# Patient Record
Sex: FEMALE | Race: WHITE | Employment: FULL TIME
[De-identification: names, ages, dates, MRNs, and addresses within clinical notes are randomized per-mention and may not be internally consistent; named-entity substitution may affect disease eponyms.]

---

## 2017-01-13 ENCOUNTER — PROCEDURE VISIT (OUTPATIENT)
Dept: OBGYN | Facility: CLINIC | Age: 37
End: 2017-01-13

## 2017-01-13 VITALS
SYSTOLIC BLOOD PRESSURE: 102 MMHG | DIASTOLIC BLOOD PRESSURE: 80 MMHG | WEIGHT: 227.6 LBS | BODY MASS INDEX: 40.33 KG/M2 | HEIGHT: 63 IN

## 2017-01-13 DIAGNOSIS — Z30.430 ENCOUNTER FOR INSERTION OF INTRAUTERINE CONTRACEPTIVE DEVICE: Primary | ICD-10-CM

## 2017-01-13 DIAGNOSIS — Z11.3 ROUTINE SCREENING FOR STI (SEXUALLY TRANSMITTED INFECTION): ICD-10-CM

## 2017-01-13 PROCEDURE — 58300 INSERT INTRAUTERINE DEVICE: CPT | Performed by: NURSE PRACTITIONER

## 2017-02-10 ENCOUNTER — OFFICE VISIT (OUTPATIENT)
Dept: OBGYN | Facility: CLINIC | Age: 37
End: 2017-02-10

## 2017-02-10 VITALS
DIASTOLIC BLOOD PRESSURE: 74 MMHG | BODY MASS INDEX: 40.11 KG/M2 | HEIGHT: 63 IN | SYSTOLIC BLOOD PRESSURE: 104 MMHG | WEIGHT: 226.4 LBS

## 2017-02-10 DIAGNOSIS — Z97.5 IUD (INTRAUTERINE DEVICE) IN PLACE: Primary | ICD-10-CM

## 2017-07-12 ENCOUNTER — OFFICE VISIT (OUTPATIENT)
Dept: FAMILY MEDICINE CLINIC | Age: 37
End: 2017-07-12
Payer: MEDICAID

## 2017-07-12 VITALS
WEIGHT: 221.8 LBS | HEIGHT: 63 IN | SYSTOLIC BLOOD PRESSURE: 97 MMHG | RESPIRATION RATE: 12 BRPM | DIASTOLIC BLOOD PRESSURE: 67 MMHG | BODY MASS INDEX: 39.3 KG/M2 | HEART RATE: 95 BPM | OXYGEN SATURATION: 97 %

## 2017-07-12 DIAGNOSIS — E28.2 PCOS (POLYCYSTIC OVARIAN SYNDROME): ICD-10-CM

## 2017-07-12 DIAGNOSIS — K21.9 GASTROESOPHAGEAL REFLUX DISEASE, ESOPHAGITIS PRESENCE NOT SPECIFIED: Primary | ICD-10-CM

## 2017-07-12 PROCEDURE — 99214 OFFICE O/P EST MOD 30 MIN: CPT | Performed by: FAMILY MEDICINE

## 2017-07-12 RX ORDER — OMEPRAZOLE 40 MG/1
40 CAPSULE, DELAYED RELEASE ORAL DAILY
Qty: 30 CAPSULE | Refills: 3 | Status: SHIPPED | OUTPATIENT
Start: 2017-07-12 | End: 2017-12-12 | Stop reason: SDUPTHER

## 2017-07-12 ASSESSMENT — PATIENT HEALTH QUESTIONNAIRE - PHQ9
2. FEELING DOWN, DEPRESSED OR HOPELESS: 1
SUM OF ALL RESPONSES TO PHQ QUESTIONS 1-9: 1
1. LITTLE INTEREST OR PLEASURE IN DOING THINGS: 0
SUM OF ALL RESPONSES TO PHQ9 QUESTIONS 1 & 2: 1

## 2017-10-03 ENCOUNTER — TELEPHONE (OUTPATIENT)
Dept: GASTROENTEROLOGY | Age: 37
End: 2017-10-03

## 2017-10-03 ENCOUNTER — OFFICE VISIT (OUTPATIENT)
Dept: GASTROENTEROLOGY | Age: 37
End: 2017-10-03
Payer: MEDICAID

## 2017-10-03 VITALS
WEIGHT: 225 LBS | HEART RATE: 108 BPM | OXYGEN SATURATION: 98 % | RESPIRATION RATE: 14 BRPM | HEIGHT: 63 IN | TEMPERATURE: 97.3 F | BODY MASS INDEX: 39.87 KG/M2 | SYSTOLIC BLOOD PRESSURE: 126 MMHG | DIASTOLIC BLOOD PRESSURE: 84 MMHG

## 2017-10-03 DIAGNOSIS — K80.20 CALCULUS OF GALLBLADDER WITHOUT CHOLECYSTITIS WITHOUT OBSTRUCTION: ICD-10-CM

## 2017-10-03 DIAGNOSIS — K21.9 GASTROESOPHAGEAL REFLUX DISEASE WITHOUT ESOPHAGITIS: Primary | ICD-10-CM

## 2017-10-03 PROCEDURE — 99244 OFF/OP CNSLTJ NEW/EST MOD 40: CPT | Performed by: INTERNAL MEDICINE

## 2017-10-03 ASSESSMENT — ENCOUNTER SYMPTOMS
NAUSEA: 1
EYES NEGATIVE: 1
BLOOD IN STOOL: 0
ANAL BLEEDING: 0
DIARRHEA: 0
CONSTIPATION: 0
RESPIRATORY NEGATIVE: 1
ABDOMINAL DISTENTION: 0
VOMITING: 0
ABDOMINAL PAIN: 0
RECTAL PAIN: 0

## 2017-10-03 NOTE — MR AVS SNAPSHOT
estimate of body fat, calculated from your height and weight. The higher your BMI, the greater your risk of heart disease, high blood pressure, type 2 diabetes, stroke, gallstones, arthritis, sleep apnea, and certain cancers. BMI is not perfect. It may overestimate body fat in athletes and people who are more muscular. Even a small weight loss (between 5 and 10 percent of your current weight) by decreasing your calorie intake and becoming more physically active will help lower your risk of developing or worsening diseases associated with obesity. Learn more at: [employer]             Medications and Orders      Your Current Medications Are              metFORMIN (GLUCOPHAGE) 500 MG tablet Take 1 tablet by mouth 2 times daily (with meals)    omeprazole (PRILOSEC) 40 MG delayed release capsule Take 1 capsule by mouth daily    Multiple Vitamin (MULTI VITAMIN DAILY PO) Take by mouth      Allergies              Bee Venom          Additional Information        Basic Information     Date Of Birth Sex Race Ethnicity Preferred Language    1980 Female White Non-/Non  English      Problem List as of 10/3/2017  Date Reviewed: 7/12/2017                Gastroesophageal reflux disease without esophagitis    PCOS (polycystic ovarian syndrome)    Menorrhagia with irregular cycle    Routine screening for STI (sexually transmitted infection)    Abnormal uterine bleeding (AUB)      Preventive Care        Date Due    HIV screening is recommended for all people regardless of risk factors  aged 15-65 years at least once (lifetime) who have never been HIV tested. 9/13/1997    Tetanus Combination Vaccine (1 - Tdap) 9/13/2001    Pap Smear 12/7/2016    Yearly Flu Vaccine (1) 9/1/2017            MyChart Signup           Our records indicate that you have an active [employer] account.     You can view your After Visit Summary by going to

## 2017-10-03 NOTE — PROGRESS NOTES
Subjective:      Patient ID: Dion Sifuentes is a 30 y.o. female. HPI  Dr. Kirby Butt MD has requested that I see Dion Sifuentes for a consult for   1. Gastroesophageal reflux disease without esophagitis    2. Calculus of gallbladder without cholecystitis without obstruction     . Patient is here for the firs time. Gastroesophageal Reflux (this has been ongoing for about 17 years, states she has burning in chest and throat, has been on Prilosec for years and seems to do a pretty good job of controlling symptom, she states if she misses a dose she has severe symptoms )    Patient is here for the 1st time  Has long-standing reflux for years more than 17 years  Been taking generic omeprazole controlling her symptoms  But if she doesn't take it the symptoms come back right away  No dysphagia or odynophagia  No nausea or vomiting  No weight loss  No bleeding  No lower GI bleed or symptoms. She does have a history of gallstones but they're not bothering her at all she has no abdominal pain she was recommended surgery in the past but because she is asymptomatic she did not do it yet        Past Medical, Family, and Social History reviewed and does contribute to the patient presenting condition. patient\"s PMH/PSH,SH,PSYCH hx, MEDs, ALLERGIES, and ROS was all reviewed and updated ion the appropriate sections    Review of Systems   Constitutional: Positive for fatigue. HENT: Positive for postnasal drip. Eyes: Negative. Respiratory: Negative. Cardiovascular: Negative. Gastrointestinal: Positive for nausea. Negative for abdominal distention, abdominal pain, anal bleeding, blood in stool, constipation, diarrhea, rectal pain and vomiting. Endocrine: Negative. Genitourinary: Negative. Musculoskeletal: Negative. Skin: Negative. Allergic/Immunologic: Positive for environmental allergies. Neurological: Positive for dizziness and headaches. Hematological: Bruises/bleeds easily.

## 2017-10-23 ENCOUNTER — TELEPHONE (OUTPATIENT)
Dept: GASTROENTEROLOGY | Age: 37
End: 2017-10-23

## 2017-10-23 NOTE — TELEPHONE ENCOUNTER
Writer tried calling pt to get her scheduked for her egd. No answer so writer LVM for pt to call office to schedule.

## 2017-12-04 ENCOUNTER — APPOINTMENT (OUTPATIENT)
Dept: GENERAL RADIOLOGY | Age: 37
End: 2017-12-04
Payer: MEDICAID

## 2017-12-04 ENCOUNTER — HOSPITAL ENCOUNTER (EMERGENCY)
Age: 37
Discharge: HOME | End: 2017-12-04
Attending: EMERGENCY MEDICINE
Payer: MEDICAID

## 2017-12-04 VITALS
BODY MASS INDEX: 38.97 KG/M2 | DIASTOLIC BLOOD PRESSURE: 80 MMHG | OXYGEN SATURATION: 98 % | WEIGHT: 220 LBS | HEART RATE: 98 BPM | SYSTOLIC BLOOD PRESSURE: 127 MMHG | RESPIRATION RATE: 16 BRPM | TEMPERATURE: 97.5 F

## 2017-12-04 DIAGNOSIS — S51.012A ELBOW LACERATION, LEFT, INITIAL ENCOUNTER: Primary | ICD-10-CM

## 2017-12-04 PROCEDURE — 99282 EMERGENCY DEPT VISIT SF MDM: CPT

## 2017-12-04 PROCEDURE — 12001 RPR S/N/AX/GEN/TRNK 2.5CM/<: CPT

## 2017-12-04 PROCEDURE — 96372 THER/PROPH/DIAG INJ SC/IM: CPT

## 2017-12-04 PROCEDURE — 6360000002 HC RX W HCPCS

## 2017-12-04 PROCEDURE — 73080 X-RAY EXAM OF ELBOW: CPT

## 2017-12-04 PROCEDURE — 2500000003 HC RX 250 WO HCPCS: Performed by: EMERGENCY MEDICINE

## 2017-12-04 RX ORDER — LIDOCAINE HYDROCHLORIDE AND EPINEPHRINE 10; 10 MG/ML; UG/ML
20 INJECTION, SOLUTION INFILTRATION; PERINEURAL ONCE
Status: COMPLETED | OUTPATIENT
Start: 2017-12-04 | End: 2017-12-04

## 2017-12-04 RX ORDER — ONDANSETRON 2 MG/ML
4 INJECTION INTRAMUSCULAR; INTRAVENOUS ONCE
Status: COMPLETED | OUTPATIENT
Start: 2017-12-04 | End: 2017-12-04

## 2017-12-04 RX ORDER — SULFAMETHOXAZOLE AND TRIMETHOPRIM 800; 160 MG/1; MG/1
1 TABLET ORAL 2 TIMES DAILY
COMMUNITY
End: 2018-01-25 | Stop reason: ALTCHOICE

## 2017-12-04 RX ORDER — ONDANSETRON 2 MG/ML
INJECTION INTRAMUSCULAR; INTRAVENOUS
Status: COMPLETED
Start: 2017-12-04 | End: 2017-12-04

## 2017-12-04 RX ADMIN — ONDANSETRON 4 MG: 2 INJECTION INTRAMUSCULAR; INTRAVENOUS at 02:10

## 2017-12-04 RX ADMIN — LIDOCAINE HYDROCHLORIDE,EPINEPHRINE BITARTRATE 20 ML: 10; .01 INJECTION, SOLUTION INFILTRATION; PERINEURAL at 01:30

## 2017-12-04 ASSESSMENT — ENCOUNTER SYMPTOMS
DIARRHEA: 0
EYE PAIN: 0
VOMITING: 0
EYE DISCHARGE: 0
ABDOMINAL PAIN: 0
NAUSEA: 0
COUGH: 0
SHORTNESS OF BREATH: 0

## 2017-12-04 ASSESSMENT — PAIN DESCRIPTION - ORIENTATION: ORIENTATION: LEFT

## 2017-12-04 ASSESSMENT — PAIN DESCRIPTION - PAIN TYPE: TYPE: ACUTE PAIN

## 2017-12-04 ASSESSMENT — PAIN DESCRIPTION - DESCRIPTORS: DESCRIPTORS: DISCOMFORT

## 2017-12-04 ASSESSMENT — PAIN SCALES - GENERAL
PAINLEVEL_OUTOF10: 8
PAINLEVEL_OUTOF10: 8

## 2017-12-04 ASSESSMENT — PAIN DESCRIPTION - LOCATION: LOCATION: ELBOW

## 2017-12-04 NOTE — ED PROVIDER NOTES
trauma to scalp or face   Eyes: Conjunctivae are normal. Pupils are equal, round, and reactive to light. Neck: Normal range of motion. Neck supple. No cervical spine tenderness   Cardiovascular: Normal rate and regular rhythm. Exam reveals no gallop and no friction rub. No murmur heard. Pulmonary/Chest: Effort normal and breath sounds normal. No respiratory distress. She has no wheezes. She has no rales. Abdominal: Soft. Bowel sounds are normal. There is no tenderness. There is no rebound and no guarding. Abdomen soft nontender   Musculoskeletal: She exhibits no edema or deformity. No deformity to the left upper extremity, mild left elbow bony tenderness, no crepitus   Neurological: She is alert and oriented to person, place, and time. She exhibits normal muscle tone. 5/5 strength and sensation intact to left upper extremity   Skin: Skin is warm and dry. No rash noted. Once her laceration to the dorsal aspect of the left elbow, no bleeding   Psychiatric: She has a normal mood and affect. Thought content normal.   Nursing note and vitals reviewed. DIFFERENTIAL  DIAGNOSIS     PLAN (LABS / IMAGING / EKG):  Orders Placed This Encounter   Procedures    XR ELBOW LEFT (MIN 3 VIEWS)       MEDICATIONS ORDERED:  Orders Placed This Encounter   Medications    lidocaine-EPINEPHrine 1 percent-1:[id number] injection 20 mL    ondansetron (ZOFRAN) 4 MG/2ML injection     Monalisa Spencer: shiinet override       DDX: Laceration, fracture, foreign body, laceration communicating with joint    DIAGNOSTIC RESULTS / [facility]     LABS:  No results found for this visit on 12/04/17. IMPRESSION: 29-year-old female with 1 cm laceration over the dorsal aspect of left elbow, bleeding controlled, no signs of neurovascular injury to left upper extremity, tenderness does not need to be updated, we'll x-ray to rule out fracture for body, place 1 stitch after irrigation.     RADIOLOGY:  XR ELBOW LEFT (MIN 3 VIEWS)   Preliminary Result   No acute findings               EKG  None    All EKG's are interpreted by the Emergency Department Physician who either signs or Co-signs this chart in the absence of a cardiologist.    EMERGENCY DEPARTMENT COURSE:  Unremarkable x-ray, laceration irrigated with normal saline, no foreign bodies seen, repaired with 3-0 Ethicon, 2 sutures, patient tolerated the became nauseated and received 4 mg of IM Zofran, no complications, discussed follow-up plan in one week with primary care provider or return to the ED for suture removal, discussed return precautions, suture management and follow-up plan with patient, she understands and agrees with plan    PROCEDURES:  Laceration Repair Procedure Note    Indication: Laceration    Procedure: The patient was placed in the appropriate position and anesthesia around the laceration was obtained by infiltration using 0.5% Bupivacaine without epinephrine. The area was then cleansed with betadine and draped in a sterile fashion. The laceration was closed with 3-0 Ethilon using interrupted sutures. There were no additional lacerations requiring repair. The wound area was then dressed with bacitracin. Total repaired wound length: 1.5 cm. Other Items: Suture count: 2    The patient tolerated the procedure well.     Complications: None        CONSULTS:  None    CRITICAL CARE:  None    FINAL IMPRESSION      1. Elbow laceration, left, initial encounter          DISPOSITION / PLAN     DISPOSITION Decision to Discharge    PATIENT REFERRED TO:  Ben Olmos MD  [address] [place] [address] Rd  [phone]    In 1 week  For suture removal      DISCHARGE MEDICATIONS:  New Prescriptions    No medications on file       Maicol Rogers DO  Emergency Medicine Resident    (Please note that portions of this note were completed with a voice recognition program.  Efforts were made to edit the dictations but occasionally words are

## 2017-12-04 NOTE — ED NOTES
Dr. Niecy Townsend at the bedside to place to sutures in to pt's left elbow, pt tolerated well     Kenisha Wynn RN  12/04/17 5817

## 2017-12-04 NOTE — ED PROVIDER NOTES
[facility]  ED     Emergency Department     Faculty Attestation    I performed a history and physical examination of the patient and discussed management with the resident. I reviewed the residents note and agree with the documented findings and plan of care. Any areas of disagreement are noted on the chart. I was personally present for the key portions of any procedures. I have documented in the chart those procedures where I was not present during the key portions. I have reviewed the emergency nurses triage note. I agree with the chief complaint, past medical history, past surgical history, allergies, medications, social and family history as documented unless otherwise noted below. For Physician Assistant/ Nurse Practitioner cases/documentation I have personally evaluated this patient and have completed at least one if not all key elements of the E/M (history, physical exam, and MDM). Additional findings are as noted. Patient presents with a laceration to her posterior left elbow after she slipped and fell and landed on the elbow tonight. Patient has no other complaints. On exam, patient is resting currently in the bed. There is a half centimeter laceration to the posterior left elbow. Patient does have full range of motion of the elbow but does complain of pain. We'll get an x-ray to rule out foreign body and plan to repair using sutures. Patient's tetanus is up-to-date.       Mickey Lockhart MD  Attending Emergency  Physician              Raydell Hashimoto, MD  12/04/17 2675

## 2017-12-12 ENCOUNTER — OFFICE VISIT (OUTPATIENT)
Dept: FAMILY MEDICINE CLINIC | Age: 37
End: 2017-12-12
Payer: MEDICAID

## 2017-12-12 VITALS
HEART RATE: 79 BPM | BODY MASS INDEX: 40.68 KG/M2 | HEIGHT: 63 IN | RESPIRATION RATE: 16 BRPM | DIASTOLIC BLOOD PRESSURE: 66 MMHG | SYSTOLIC BLOOD PRESSURE: 105 MMHG | OXYGEN SATURATION: 97 % | WEIGHT: 229.6 LBS

## 2017-12-12 DIAGNOSIS — K21.9 GASTROESOPHAGEAL REFLUX DISEASE, ESOPHAGITIS PRESENCE NOT SPECIFIED: Primary | ICD-10-CM

## 2017-12-12 DIAGNOSIS — S51.012D LACERATION OF LEFT ELBOW, SUBSEQUENT ENCOUNTER: ICD-10-CM

## 2017-12-12 PROCEDURE — G8427 DOCREV CUR MEDS BY ELIG CLIN: HCPCS | Performed by: FAMILY MEDICINE

## 2017-12-12 PROCEDURE — 1036F TOBACCO NON-USER: CPT | Performed by: FAMILY MEDICINE

## 2017-12-12 PROCEDURE — 99213 OFFICE O/P EST LOW 20 MIN: CPT | Performed by: FAMILY MEDICINE

## 2017-12-12 PROCEDURE — G8484 FLU IMMUNIZE NO ADMIN: HCPCS | Performed by: FAMILY MEDICINE

## 2017-12-12 PROCEDURE — G8417 CALC BMI ABV UP PARAM F/U: HCPCS | Performed by: FAMILY MEDICINE

## 2017-12-12 PROCEDURE — 99024 POSTOP FOLLOW-UP VISIT: CPT | Performed by: FAMILY MEDICINE

## 2017-12-12 RX ORDER — OMEPRAZOLE 40 MG/1
40 CAPSULE, DELAYED RELEASE ORAL DAILY
Qty: 30 CAPSULE | Refills: 3 | Status: SHIPPED | OUTPATIENT
Start: 2017-12-12 | End: 2018-06-22 | Stop reason: SDUPTHER

## 2017-12-13 NOTE — PROGRESS NOTES
General FM note    Kay Rodney is a 30 y.o. female who presents today for follow up on her  medical conditions as noted below. Kay Rodney is c/o of   Chief Complaint   Patient presents with    Suture / Staple Removal       Patient Active Problem List:     Abnormal uterine bleeding (AUB)     Routine screening for STI (sexually transmitted infection)     Menorrhagia with irregular cycle     PCOS (polycystic ovarian syndrome)     Gastroesophageal reflux disease without esophagitis     Past Medical History:   Diagnosis Date    Anxiety     Dizziness     Gall bladder pain     Headache(784.0)     HSV-1 (herpes simplex virus 1) infection     Hyperthyroidism       Past Surgical History:   Procedure Laterality Date    ENDOMETRIAL BIOPSY  08/09/2016    negative atypia or malignancy    INTRAUTERINE DEVICE INSERTION  01/13/2017    Mirena     Family History   Problem Relation Age of Onset    Lupus Father     Stroke Father     Alcohol Abuse Father     Alcohol Abuse Mother     Thyroid Disease Mother      Current Outpatient Prescriptions   Medication Sig Dispense Refill    omeprazole (PRILOSEC) 40 MG delayed release capsule Take 1 capsule by mouth daily 30 capsule 3    sulfamethoxazole-trimethoprim (BACTRIM DS;SEPTRA DS) 800-160 MG per tablet Take 1 tablet by mouth 2 times daily      metFORMIN (GLUCOPHAGE) 500 MG tablet Take 1 tablet by mouth 2 times daily (with meals) 60 tablet 3    Multiple Vitamin (MULTI VITAMIN DAILY PO) Take by mouth       No current facility-administered medications for this visit. ALLERGIES:    Allergies   Allergen Reactions    Bee Venom        Social History   Substance Use Topics    Smoking status: Former Smoker    Smokeless tobacco: Never Used    Alcohol use 0.0 oz/week      Comment: socially      Body mass index is 40.68 kg/m².   /66   Pulse 79   Resp 16   Ht 5' 2.99\" (1.6 m)   Wt 229 lb 9.6 oz (104.1 kg)   SpO2 97%   BMI 40.68 kg/m²     Subjective:

## 2018-01-25 ENCOUNTER — OFFICE VISIT (OUTPATIENT)
Dept: FAMILY MEDICINE CLINIC | Age: 38
End: 2018-01-25
Payer: MEDICAID

## 2018-01-25 VITALS
RESPIRATION RATE: 16 BRPM | WEIGHT: 237 LBS | HEART RATE: 91 BPM | BODY MASS INDEX: 41.99 KG/M2 | DIASTOLIC BLOOD PRESSURE: 76 MMHG | SYSTOLIC BLOOD PRESSURE: 110 MMHG | HEIGHT: 63 IN

## 2018-01-25 DIAGNOSIS — G44.209 MUSCLE TENSION HEADACHE: Primary | ICD-10-CM

## 2018-01-25 DIAGNOSIS — S13.9XXA CERVICAL SPRAIN, INITIAL ENCOUNTER: ICD-10-CM

## 2018-01-25 PROCEDURE — G8417 CALC BMI ABV UP PARAM F/U: HCPCS | Performed by: FAMILY MEDICINE

## 2018-01-25 PROCEDURE — 99214 OFFICE O/P EST MOD 30 MIN: CPT | Performed by: FAMILY MEDICINE

## 2018-01-25 PROCEDURE — G8427 DOCREV CUR MEDS BY ELIG CLIN: HCPCS | Performed by: FAMILY MEDICINE

## 2018-01-25 PROCEDURE — G8484 FLU IMMUNIZE NO ADMIN: HCPCS | Performed by: FAMILY MEDICINE

## 2018-01-25 PROCEDURE — 1036F TOBACCO NON-USER: CPT | Performed by: FAMILY MEDICINE

## 2018-01-25 RX ORDER — IBUPROFEN 800 MG/1
800 TABLET ORAL EVERY 6 HOURS PRN
COMMUNITY
End: 2018-01-25 | Stop reason: SDUPTHER

## 2018-01-25 RX ORDER — IBUPROFEN 800 MG/1
800 TABLET ORAL EVERY 6 HOURS PRN
Qty: 120 TABLET | Refills: 1 | Status: SHIPPED | OUTPATIENT
Start: 2018-01-25 | End: 2020-07-07 | Stop reason: SDUPTHER

## 2018-01-25 ASSESSMENT — PATIENT HEALTH QUESTIONNAIRE - PHQ9
SUM OF ALL RESPONSES TO PHQ9 QUESTIONS 1 & 2: 0
2. FEELING DOWN, DEPRESSED OR HOPELESS: 0
1. LITTLE INTEREST OR PLEASURE IN DOING THINGS: 0
SUM OF ALL RESPONSES TO PHQ QUESTIONS 1-9: 0
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
## 2018-02-05 ENCOUNTER — OFFICE VISIT (OUTPATIENT)
Dept: FAMILY MEDICINE CLINIC | Age: 38
End: 2018-02-05
Payer: MEDICAID

## 2018-02-05 VITALS
WEIGHT: 238 LBS | BODY MASS INDEX: 42.17 KG/M2 | HEIGHT: 63 IN | OXYGEN SATURATION: 100 % | RESPIRATION RATE: 16 BRPM | DIASTOLIC BLOOD PRESSURE: 68 MMHG | HEART RATE: 81 BPM | SYSTOLIC BLOOD PRESSURE: 96 MMHG

## 2018-02-05 DIAGNOSIS — E28.2 PCOS (POLYCYSTIC OVARIAN SYNDROME): ICD-10-CM

## 2018-02-05 DIAGNOSIS — Z00.01 ENCOUNTER FOR WELL ADULT EXAM WITH ABNORMAL FINDINGS: Primary | ICD-10-CM

## 2018-02-05 PROCEDURE — G8417 CALC BMI ABV UP PARAM F/U: HCPCS | Performed by: FAMILY MEDICINE

## 2018-02-05 PROCEDURE — 99395 PREV VISIT EST AGE 18-39: CPT | Performed by: FAMILY MEDICINE

## 2018-02-05 PROCEDURE — G8484 FLU IMMUNIZE NO ADMIN: HCPCS | Performed by: FAMILY MEDICINE

## 2018-02-05 PROCEDURE — 1036F TOBACCO NON-USER: CPT | Performed by: FAMILY MEDICINE

## 2018-02-05 PROCEDURE — G8427 DOCREV CUR MEDS BY ELIG CLIN: HCPCS | Performed by: FAMILY MEDICINE

## 2018-02-05 NOTE — PATIENT INSTRUCTIONS
Patient Education          phentermine  Pronunciation:  GERRY Chan Bacca  Brand: Adipex-P, Sheree-Cap, Suprenza, T-Diet, Zantryl  What is the most important information I should know about phentermine? Do not use phentermine if you are pregnant or breast-feeding a baby. You should not take phentermine if you have a history of heart disease (coronary artery disease, heart rhythm problems, congestive heart failure, stroke), severe or uncontrolled high blood pressure, overactive thyroid, glaucoma, a history of pulmonary hypertension, if you are in an agitated state, or if you have a history of drug or alcohol abuse. Do not use phentermine if you have taken an MAO inhibitor in the past 14 days. A dangerous drug interaction could occur. MAO inhibitors include isocarboxazid, linezolid, phenelzine, rasagiline, selegiline, and tranylcypromine. What is phentermine? Phentermine is a stimulant similar to an amphetamine. Phentermine is an appetite suppressant that affects the central nervous system. Phentermine is used together with diet and exercise to treat obesity (overweight) in people with risk factors such as high blood pressure, high cholesterol, or diabetes. Phentermine may also be used for purposes not listed in this medication guide. What should I discuss with my healthcare provider before taking phentermine? Do not use phentermine if you have taken an MAO inhibitor in the past 14 days. A dangerous drug interaction could occur. MAO inhibitors include isocarboxazid, linezolid, phenelzine, rasagiline, selegiline, and tranylcypromine.   You should not take phentermine if you are allergic to it, or if you have:  · a history of heart disease (coronary artery disease, heart rhythm problems, congestive heart failure, stroke);  · a history of pulmonary hypertension;  · severe or uncontrolled high blood pressure;  · overactive thyroid;  · glaucoma;  · if you are pregnant or breast-feeding;  · if you are in an agitated state;  · if you have a history of drug or alcohol abuse; or  · if you are allergic to other diet pills, amphetamines, stimulants, or cold medications. Taking phentermine together with other diet medications such as fenfluramine (Phen-Fen) or dexfenfluramine (Redux) can cause a rare fatal lung disorder called pulmonary hypertension. Do not take phentermine with any other diet medications without your doctor's advice. To make sure you phentermine is safe for you, tell your doctor if you have:  · high blood pressure;  · diabetes;  · kidney disease;  · a thyroid disorder; or  · if you are allergic to aspirin or to yellow food dye (FD & C Yellow No. 5, or tartrazine). Phentermine may be habit forming. Never share phentermine with another person, especially someone with a history of drug abuse or addiction. Keep the medication in a place where others cannot get to it. FDA pregnancy category X. Weight loss during pregnancy can harm an unborn baby, even if you are overweight. Do not use phentermine if you are pregnant. Phentermine can pass into breast milk and may harm a nursing baby. You should not breast-feed while taking phentermine. Do not give this medication to a child younger than 12years old. How should I take phentermine? Follow all directions on your prescription label. Do not take this medicine in larger or smaller amounts or for longer than recommended. Some brands of phentermine should be taken on an empty stomach before breakfast or within 2 hours after breakfast.  Suprenza disintegrating tablets can be taken with or without food. Using dry hands, remove the Suprenza tablet from the medicine bottle and place the tablet in your mouth. It will begin to dissolve right away. Do not swallow the tablet whole. Allow it to dissolve in your mouth without chewing.   To prevent sleep problems, take this medication early in the day, no later than 6:00pm.  Talk with your doctor if you have increased hunger or if you otherwise think the medication is not working properly. Taking more of this medication will not make it more effective and can cause serious, life-threatening side effects. Phentermine should be taken only for a short time, such as a few weeks. Do not stop using phentermine suddenly, or you could have unpleasant withdrawal symptoms. Ask your doctor how to safely stop using phentermine. Store at room temperature away from moisture and heat. Keep track of the amount of medicine used from each new bottle. Phentermine is a drug of abuse and you should be aware if anyone is using your medicine improperly or without a prescription. What happens if I miss a dose? Take the missed dose as soon as you remember. Skip the missed dose if it is almost time for your next scheduled dose. Do not  take extra medicine to make up the missed dose. What happens if I overdose? Seek emergency medical attention or call the Poison Help line at 1-984.173.9362. An overdose of phentermine can be fatal.  What should I avoid while taking phentermine? Drinking alcohol can increase certain side effects of phentermine. Phentermine may impair your thinking or reactions. Be careful if you drive or do anything that requires you to be alert. What are the possible side effects of phentermine? Get emergency medical help if you have any of these signs of an allergic reaction: hives; wheezing, chest tightness, trouble breathing; swelling of your face, lips, tongue, or throat.   Call your doctor at once if you have a serious side effect such as:  · feeling short of breath, even with mild exertion;  · chest pain, feeling like you might pass out;  · swelling in your ankles or feet;  · pounding heartbeats or fluttering in your chest;  · confusion or irritability, unusual thoughts or behavior;  · feelings of extreme happiness or sadness; or  · dangerously high blood pressure (severe headache, blurred vision, buzzing in your ears, anxiety, chest pain, shortness of breath, uneven heartbeats, seizure). Common side effects may include:  · feeling restless or hyperactive;  · headache, dizziness, tremors;  · sleep problems (insomnia);  · dry mouth or an unpleasant taste in your mouth;  · diarrhea or constipation, upset stomach; or  · increased or decreased interest in sex, impotence. This is not a complete list of side effects and others may occur. Call your doctor for medical advice about side effects. You may report side effects to FDA at 8-063-FDA-7260. What other drugs will affect phentermine? Taking this medicine with other stimulant drugs that make you restless or hyperactive can worsen these effects. Ask your doctor before taking phentermine with diet pills, other stimulants, or medicine to treat attention deficit hyperactivity disorder (ADHD). Tell your doctor about all medicines you use, and those you start or stop using during your treatment with phentermine, especially:  · an antidepressant --citalopram, escitalopram, fluoxetine, fluvoxamine, paroxetine, sertraline. This list is not complete. Other drugs may interact with phentermine, including prescription and over-the-counter medicines, vitamins, and herbal products. Not all possible interactions are listed in this medication guide. Where can I get more information? Your pharmacist can provide more information about phentermine. Remember, keep this and all other medicines out of the reach of children, never share your medicines with others, and use this medication only for the indication prescribed. Every effort has been made to ensure that the information provided by [address] is accurate, up-to-date, and complete, but no guarantee is made to that effect. Drug information contained herein may be time sensitive.  [facility] information has been compiled for use by healthcare practitioners and consumers in the [place] and therefore [facility] does not warrant that uses

## 2018-02-07 ENCOUNTER — HOSPITAL ENCOUNTER (OUTPATIENT)
Age: 38
Discharge: HOME | End: 2018-02-07
Payer: MEDICAID

## 2018-02-07 DIAGNOSIS — Z00.01 ENCOUNTER FOR WELL ADULT EXAM WITH ABNORMAL FINDINGS: ICD-10-CM

## 2018-02-07 LAB
-: ABNORMAL
ABSOLUTE EOS #: 0.15 K/UL (ref 0–0.44)
ABSOLUTE IMMATURE GRANULOCYTE: <0.03 K/UL (ref 0–0.3)
ABSOLUTE LYMPH #: 2.04 K/UL (ref 1.1–3.7)
ABSOLUTE MONO #: 0.53 K/UL (ref 0.1–1.2)
ALBUMIN SERPL-MCNC: 4.3 G/DL (ref 3.5–5.2)
ALBUMIN/GLOBULIN RATIO: 1.4 (ref 1–2.5)
ALP BLD-CCNC: 41 U/L (ref 35–104)
ALT SERPL-CCNC: 29 U/L (ref 5–33)
AMORPHOUS: ABNORMAL
ANION GAP SERPL CALCULATED.3IONS-SCNC: 12 MMOL/L (ref 9–17)
AST SERPL-CCNC: 23 U/L
BACTERIA: ABNORMAL
BASOPHILS # BLD: 0 % (ref 0–2)
BASOPHILS ABSOLUTE: <0.03 K/UL (ref 0–0.2)
BILIRUB SERPL-MCNC: 0.25 MG/DL (ref 0.3–1.2)
BILIRUBIN URINE: NEGATIVE
BUN BLDV-MCNC: 11 MG/DL (ref 6–20)
BUN/CREAT BLD: ABNORMAL (ref 9–20)
CALCIUM SERPL-MCNC: 8.9 MG/DL (ref 8.6–10.4)
CASTS UA: ABNORMAL /LPF (ref 0–2)
CHLORIDE BLD-SCNC: 100 MMOL/L (ref 98–107)
CHOLESTEROL/HDL RATIO: 4.1
CHOLESTEROL: 198 MG/DL
CO2: 24 MMOL/L (ref 20–31)
COLOR: YELLOW
COMMENT UA: ABNORMAL
CREAT SERPL-MCNC: 0.91 MG/DL (ref 0.5–0.9)
CRYSTALS, UA: ABNORMAL /HPF
DIFFERENTIAL TYPE: NORMAL
EOSINOPHILS RELATIVE PERCENT: 3 % (ref 1–4)
EPITHELIAL CELLS UA: ABNORMAL /HPF (ref 0–5)
ESTIMATED AVERAGE GLUCOSE: 105 MG/DL
GFR AFRICAN AMERICAN: >60 ML/MIN
GFR NON-AFRICAN AMERICAN: >60 ML/MIN
GFR SERPL CREATININE-BSD FRML MDRD: ABNORMAL ML/MIN/{1.73_M2}
GFR SERPL CREATININE-BSD FRML MDRD: ABNORMAL ML/MIN/{1.73_M2}
GLUCOSE BLD-MCNC: 93 MG/DL (ref 70–99)
GLUCOSE URINE: NEGATIVE
HBA1C MFR BLD: 5.3 % (ref 4–6)
HCT VFR BLD CALC: 43.1 % (ref 36.3–47.1)
HDLC SERPL-MCNC: 48 MG/DL
HEMOGLOBIN: 14.3 G/DL (ref 11.9–15.1)
IMMATURE GRANULOCYTES: 0 %
KETONES, URINE: NEGATIVE
LDL CHOLESTEROL: 130 MG/DL (ref 0–130)
LEUKOCYTE ESTERASE, URINE: ABNORMAL
LYMPHOCYTES # BLD: 42 % (ref 24–43)
MCH RBC QN AUTO: 29.9 PG (ref 25.2–33.5)
MCHC RBC AUTO-ENTMCNC: 33.2 G/DL (ref 28.4–34.8)
MCV RBC AUTO: 90 FL (ref 82.6–102.9)
MONOCYTES # BLD: 11 % (ref 3–12)
MUCUS: ABNORMAL
NITRITE, URINE: NEGATIVE
NRBC AUTOMATED: 0 PER 100 WBC
OTHER OBSERVATIONS UA: ABNORMAL
PDW BLD-RTO: 12.4 % (ref 11.8–14.4)
PH UA: 6.5 (ref 5–8)
PLATELET # BLD: 408 K/UL (ref 138–453)
PLATELET ESTIMATE: NORMAL
PMV BLD AUTO: 9.4 FL (ref 8.1–13.5)
POTASSIUM SERPL-SCNC: 4.1 MMOL/L (ref 3.7–5.3)
PROTEIN UA: NEGATIVE
RBC # BLD: 4.79 M/UL (ref 3.95–5.11)
RBC # BLD: NORMAL 10*6/UL
RBC UA: ABNORMAL /HPF (ref 0–2)
RENAL EPITHELIAL, UA: ABNORMAL /HPF
SEG NEUTROPHILS: 44 % (ref 36–65)
SEGMENTED NEUTROPHILS ABSOLUTE COUNT: 2.08 K/UL (ref 1.5–8.1)
SODIUM BLD-SCNC: 136 MMOL/L (ref 135–144)
SPECIFIC GRAVITY UA: 1.02 (ref 1–1.03)
THYROID PEROXIDASE (TPO) AB: 15.4 IU/ML (ref 0–35)
THYROXINE, FREE: 1.06 NG/DL (ref 0.93–1.7)
TOTAL PROTEIN: 7.3 G/DL (ref 6.4–8.3)
TRICHOMONAS: ABNORMAL
TRIGL SERPL-MCNC: 98 MG/DL
TSH SERPL DL<=0.05 MIU/L-ACNC: 2.9 MIU/L (ref 0.3–5)
TURBIDITY: CLEAR
URINE HGB: ABNORMAL
UROBILINOGEN, URINE: NORMAL
VLDLC SERPL CALC-MCNC: NORMAL MG/DL (ref 1–30)
WBC # BLD: 4.8 K/UL (ref 3.5–11.3)
WBC # BLD: NORMAL 10*3/UL
WBC UA: ABNORMAL /HPF (ref 0–5)
YEAST: ABNORMAL

## 2018-02-07 PROCEDURE — 84439 ASSAY OF FREE THYROXINE: CPT

## 2018-02-07 PROCEDURE — 36415 COLL VENOUS BLD VENIPUNCTURE: CPT

## 2018-02-07 PROCEDURE — 80053 COMPREHEN METABOLIC PANEL: CPT

## 2018-02-07 PROCEDURE — 80061 LIPID PANEL: CPT

## 2018-02-07 PROCEDURE — 83036 HEMOGLOBIN GLYCOSYLATED A1C: CPT

## 2018-02-07 PROCEDURE — 84443 ASSAY THYROID STIM HORMONE: CPT

## 2018-02-07 PROCEDURE — 85025 COMPLETE CBC W/AUTO DIFF WBC: CPT

## 2018-02-07 PROCEDURE — 86376 MICROSOMAL ANTIBODY EACH: CPT

## 2018-02-07 PROCEDURE — 81001 URINALYSIS AUTO W/SCOPE: CPT

## 2018-02-08 ENCOUNTER — HOSPITAL ENCOUNTER (OUTPATIENT)
Dept: PHYSICAL THERAPY | Facility: CLINIC | Age: 38
Setting detail: THERAPIES SERIES
Discharge: HOME | End: 2018-02-08
Payer: MEDICAID

## 2018-02-08 RX ORDER — PHENTERMINE HYDROCHLORIDE 37.5 MG/1
37.5 TABLET ORAL
Qty: 30 TABLET | Refills: 0 | Status: SHIPPED | OUTPATIENT
Start: 2018-02-08 | End: 2018-03-10

## 2018-02-08 NOTE — FLOWSHEET NOTE
[] Claretha Dubin        Outpatient Physical                Therapy       [address]       Phone: [phone]       Fax: [phone] [x] [facility] at [address]       Phone: [phone]       Fax: [phone] [] Benny.  [address] [place] [address]     [id number] [address]      Phone: [phone]      Fax:  [phone]     Physical Therapy Cancel/No Show note    Date: 2018  Patient: Juancarlos Chery  : 1980  MRN: [id number]    Cancels/No Shows to date: 1 cx    For today's appointment patient:  [x]  Cancelled initial evaluation  []  Rescheduled appointment  []  No-show     Reason given by patient:  []  Patient ill  []  Conflicting appointment  []  No transportation    [x]  Conflict with work  []  No reason given  []  Weather related  []  Other:      Comments: pt to call back when she can schedule PT      []  Next appointment was confirmed    Electronically signed by: Armstead Blizzard, PT

## 2018-03-12 ENCOUNTER — OFFICE VISIT (OUTPATIENT)
Dept: FAMILY MEDICINE CLINIC | Age: 38
End: 2018-03-12
Payer: MEDICAID

## 2018-03-12 VITALS
WEIGHT: 225.6 LBS | BODY MASS INDEX: 39.97 KG/M2 | SYSTOLIC BLOOD PRESSURE: 112 MMHG | DIASTOLIC BLOOD PRESSURE: 74 MMHG | TEMPERATURE: 98.2 F | RESPIRATION RATE: 16 BRPM | HEIGHT: 63 IN | OXYGEN SATURATION: 100 % | HEART RATE: 96 BPM

## 2018-03-12 DIAGNOSIS — E66.9 OBESITY (BMI 35.0-39.9 WITHOUT COMORBIDITY): Primary | ICD-10-CM

## 2018-03-12 PROCEDURE — G8427 DOCREV CUR MEDS BY ELIG CLIN: HCPCS | Performed by: FAMILY MEDICINE

## 2018-03-12 PROCEDURE — G8417 CALC BMI ABV UP PARAM F/U: HCPCS | Performed by: FAMILY MEDICINE

## 2018-03-12 PROCEDURE — 99213 OFFICE O/P EST LOW 20 MIN: CPT | Performed by: FAMILY MEDICINE

## 2018-03-12 PROCEDURE — 1036F TOBACCO NON-USER: CPT | Performed by: FAMILY MEDICINE

## 2018-03-12 PROCEDURE — G8484 FLU IMMUNIZE NO ADMIN: HCPCS | Performed by: FAMILY MEDICINE

## 2018-03-12 RX ORDER — PHENTERMINE HYDROCHLORIDE 37.5 MG/1
37.5 TABLET ORAL
Qty: 30 TABLET | Refills: 0 | Status: SHIPPED | OUTPATIENT
Start: 2018-03-12 | End: 2018-04-11

## 2018-03-12 NOTE — PROGRESS NOTES
General FM note    Purvi Andrade is a 30 y.o. female who presents today for follow up on her  medical conditions as noted below. Purvi Andrade is c/o of   Chief Complaint   Patient presents with    Medication Refill     apidex        Patient Active Problem List:     Abnormal uterine bleeding (AUB)     Routine screening for STI (sexually transmitted infection)     Menorrhagia with irregular cycle     PCOS (polycystic ovarian syndrome)     Gastroesophageal reflux disease without esophagitis     Past Medical History:   Diagnosis Date    Anxiety     Dizziness     Gall bladder pain     Headache(784.0)     HSV-1 (herpes simplex virus 1) infection     Hyperthyroidism       Past Surgical History:   Procedure Laterality Date    ENDOMETRIAL BIOPSY  08/09/2016    negative atypia or malignancy    INTRAUTERINE DEVICE INSERTION  01/13/2017    Mirena     Family History   Problem Relation Age of Onset    Lupus Father     Stroke Father     Alcohol Abuse Father     Alcohol Abuse Mother     Thyroid Disease Mother      Current Outpatient Prescriptions   Medication Sig Dispense Refill    phentermine (ADIPEX-P) 37.5 MG tablet Take 1 tablet by mouth every morning (before breakfast) for 30 days. 30 tablet 0    ibuprofen (ADVIL;MOTRIN) 800 MG tablet Take 1 tablet by mouth every 6 hours as needed for Pain 120 tablet 1    omeprazole (PRILOSEC) 40 MG delayed release capsule Take 1 capsule by mouth daily 30 capsule 3    metFORMIN (GLUCOPHAGE) 500 MG tablet Take 1 tablet by mouth 2 times daily (with meals) 60 tablet 3    Multiple Vitamin (MULTI VITAMIN DAILY PO) Take by mouth       No current facility-administered medications for this visit. ALLERGIES:    Allergies   Allergen Reactions    Bee Venom        Social History   Substance Use Topics    Smoking status: Former Smoker    Smokeless tobacco: Never Used    Alcohol use 0.0 oz/week      Comment: socially      Body mass index is 39.96 kg/m².   /74   Pulse 96 Temp 98.2 °F (36.8 °C)   Resp 16   Ht 5' 3\" (1.6 m)   Wt 225 lb 9.6 oz (102.3 kg)   SpO2 100%   BMI 39.96 kg/m²     Subjective:      HPI  30-year-old female coming in today for weight check. She lost 13 lbs with first script. Not hungry. Smaller portions. later at night not hungry does not eat late anymore. Exercises: doing 1-2 times a week mostly weightlifting and cardio. No SE. Review of Systems   Constitutional: Negative for fever and unexpected weight change. Respiratory: Negative for cough and shortness of breath. Cardiovascular: Negative for chest pain and leg swelling. Skin: Negative for color change and rash. Objective:   Physical Exam  Constitutional: VS (see above). General appearance: normal development, habitus and attention, no deformities. Eyes: normal conjunctiva and lids. CAV: RRR, no RMG. No edema lower extremities. Pulmo: CTA bilateral, no CWR. Skin: no rashes, lesions or ulcers. Musculoskeletal: normal gait. Nails: no clubbing or cyanosis. Psychiatric: alert and oriented to place, time and person. Normal mood and affect. Assessment:      1. Obesity (BMI 35.0-39.9 without comorbidity)  phentermine (ADIPEX-P) 37.5 MG tablet       Plan:   Second prescription of Adipex provided. Patient will continue current diet and exercise regimen. I discussed with her to exercise at least 30 minutes 5 times a week. Decrease carbohydrate intake. Increase fibers and protein. See me back in 4 weeks for weight check. Call if any changes. Stop Adipex if you have any side effects. Call or return to clinic prn if these symptoms worsen or fail to improve as anticipated. I have reviewed the instructions with the patient, answering all questions to her satisfaction. Return in about 4 weeks (around 4/9/2018), or if symptoms worsen or fail to improve, for weight. No orders of the defined types were placed in this encounter.     Orders Placed This Encounter   Medications   

## 2018-04-18 ENCOUNTER — OFFICE VISIT (OUTPATIENT)
Dept: FAMILY MEDICINE CLINIC | Age: 38
End: 2018-04-18
Payer: MEDICAID

## 2018-04-18 VITALS
WEIGHT: 217.8 LBS | HEART RATE: 127 BPM | SYSTOLIC BLOOD PRESSURE: 107 MMHG | RESPIRATION RATE: 16 BRPM | BODY MASS INDEX: 38.58 KG/M2 | OXYGEN SATURATION: 97 % | DIASTOLIC BLOOD PRESSURE: 65 MMHG

## 2018-04-18 DIAGNOSIS — E66.9 OBESITY (BMI 35.0-39.9 WITHOUT COMORBIDITY): Primary | ICD-10-CM

## 2018-04-18 PROCEDURE — G8417 CALC BMI ABV UP PARAM F/U: HCPCS | Performed by: FAMILY MEDICINE

## 2018-04-18 PROCEDURE — G8427 DOCREV CUR MEDS BY ELIG CLIN: HCPCS | Performed by: FAMILY MEDICINE

## 2018-04-18 PROCEDURE — 1036F TOBACCO NON-USER: CPT | Performed by: FAMILY MEDICINE

## 2018-04-18 PROCEDURE — 99213 OFFICE O/P EST LOW 20 MIN: CPT | Performed by: FAMILY MEDICINE

## 2018-04-18 RX ORDER — PHENTERMINE HYDROCHLORIDE 37.5 MG/1
37.5 TABLET ORAL
Qty: 30 TABLET | Refills: 0 | Status: SHIPPED | OUTPATIENT
Start: 2018-04-18 | End: 2018-05-18

## 2018-06-22 DIAGNOSIS — K21.9 GASTROESOPHAGEAL REFLUX DISEASE, ESOPHAGITIS PRESENCE NOT SPECIFIED: ICD-10-CM

## 2018-06-22 RX ORDER — OMEPRAZOLE 40 MG/1
40 CAPSULE, DELAYED RELEASE ORAL DAILY
Qty: 30 CAPSULE | Refills: 3 | Status: SHIPPED | OUTPATIENT
Start: 2018-06-22 | End: 2018-12-03 | Stop reason: SDUPTHER

## 2018-09-10 ENCOUNTER — OFFICE VISIT (OUTPATIENT)
Dept: FAMILY MEDICINE CLINIC | Age: 38
End: 2018-09-10
Payer: MEDICAID

## 2018-09-10 VITALS
RESPIRATION RATE: 16 BRPM | BODY MASS INDEX: 40.78 KG/M2 | HEART RATE: 72 BPM | OXYGEN SATURATION: 95 % | DIASTOLIC BLOOD PRESSURE: 71 MMHG | WEIGHT: 230.2 LBS | SYSTOLIC BLOOD PRESSURE: 110 MMHG

## 2018-09-10 DIAGNOSIS — L30.9 DERMATITIS: Primary | ICD-10-CM

## 2018-09-10 PROCEDURE — 1036F TOBACCO NON-USER: CPT | Performed by: FAMILY MEDICINE

## 2018-09-10 PROCEDURE — G8417 CALC BMI ABV UP PARAM F/U: HCPCS | Performed by: FAMILY MEDICINE

## 2018-09-10 PROCEDURE — 99213 OFFICE O/P EST LOW 20 MIN: CPT | Performed by: FAMILY MEDICINE

## 2018-09-10 PROCEDURE — G8427 DOCREV CUR MEDS BY ELIG CLIN: HCPCS | Performed by: FAMILY MEDICINE

## 2018-09-10 RX ORDER — TRIAMCINOLONE ACETONIDE 1 MG/G
CREAM TOPICAL
Qty: 30 G | Refills: 0 | Status: SHIPPED | OUTPATIENT
Start: 2018-09-10 | End: 2020-07-07

## 2018-09-10 RX ORDER — HYDROXYZINE HYDROCHLORIDE 25 MG/1
25 TABLET, FILM COATED ORAL NIGHTLY
Qty: 10 TABLET | Refills: 0 | Status: SHIPPED | OUTPATIENT
Start: 2018-09-10 | End: 2018-09-20

## 2018-09-10 NOTE — PROGRESS NOTES
Visit Information    Have you changed or started any medications since your last visit including any over-the-counter medicines, vitamins, or herbal medicines? no   Are you having any side effects from any of your medications? -  no  Have you stopped taking any of your medications? Is so, why? -  no    Have you seen any other physician or provider since your last visit? No  Have you had any other diagnostic tests since your last visit? No  Have you been seen in the emergency room and/or had an admission to a hospital since we last saw you? No  Have you had your routine dental cleaning in the past 6 months? yes -     Have you activated your [employer] account? If not, what are your barriers?  Yes     Patient Care Team:  Ashli Pike MD as PCP - General    Medical History Review  Past Medical, Family, and Social History reviewed and does contribute to the patient presenting condition    Health Maintenance   Topic Date Due    HIV screen  09/13/1997    Cervical cancer screen  12/07/2016    Flu vaccine (1) 09/01/2018    DTaP/Tdap/Td vaccine (2 - Td) 10/13/2026

## 2018-09-10 NOTE — PROGRESS NOTES
Subjective:       Arpan Fisher is a 30 y.o. female who presents for evaluation of rash involving the R LE. Rash started several months ago. Lesions are pink in color, and flat in texture. Rash has not changed over time. Rash is pruritic. Associated symptoms: none. Patient denies: fever, headache, arthralgia, abdominal pain, vomiting, dark urine, diarrhea, irritability. Patient has not had contacts with similar rash. Patient has not had new exposures (soaps, lotions, laundry detergents, foods, medications, plants, insects or animals.)    Patient's medications, allergies, past medical, surgical, social and family histories were reviewed and updated as appropriate. Objective:      /71   Pulse 72   Resp 16   Wt 230 lb 3.2 oz (104.4 kg)   SpO2 95%   BMI 40.78 kg/m²   General:  alert, appears stated age, cooperative and no distress   Skin:  plaque(s) noted on R anterior LE       Nacori was seen today for rash. Diagnoses and all orders for this visit:    Dermatitis  -     triamcinolone (KENALOG) 0.1 % cream; Apply topically 2 times daily. -     hydrOXYzine (ATARAX) 25 MG tablet; Take 1 tablet by mouth nightly for 10 days     I believe this is a dermatitis. We'll start topical medication. Patient will see me back in 4-6 weeks for well visit and for follow-up. If needed, will send the patient to dermatologist.    Call or return to clinic prn if these symptoms worsen or fail to improve as anticipated. I have reviewed the instructions with the patient, answering all questions to her satisfaction.

## 2018-10-23 ENCOUNTER — OFFICE VISIT (OUTPATIENT)
Dept: FAMILY MEDICINE CLINIC | Age: 38
End: 2018-10-23
Payer: MEDICAID

## 2018-10-23 VITALS
TEMPERATURE: 98.3 F | RESPIRATION RATE: 14 BRPM | HEIGHT: 63 IN | OXYGEN SATURATION: 95 % | HEART RATE: 100 BPM | SYSTOLIC BLOOD PRESSURE: 98 MMHG | WEIGHT: 233 LBS | BODY MASS INDEX: 41.29 KG/M2 | DIASTOLIC BLOOD PRESSURE: 59 MMHG

## 2018-10-23 DIAGNOSIS — E66.01 MORBID OBESITY WITH BMI OF 40.0-44.9, ADULT (HCC): Primary | ICD-10-CM

## 2018-10-23 PROCEDURE — G8427 DOCREV CUR MEDS BY ELIG CLIN: HCPCS | Performed by: FAMILY MEDICINE

## 2018-10-23 PROCEDURE — 1036F TOBACCO NON-USER: CPT | Performed by: FAMILY MEDICINE

## 2018-10-23 PROCEDURE — G8484 FLU IMMUNIZE NO ADMIN: HCPCS | Performed by: FAMILY MEDICINE

## 2018-10-23 PROCEDURE — 99213 OFFICE O/P EST LOW 20 MIN: CPT | Performed by: FAMILY MEDICINE

## 2018-10-23 PROCEDURE — G8417 CALC BMI ABV UP PARAM F/U: HCPCS | Performed by: FAMILY MEDICINE

## 2018-10-23 RX ORDER — PHENTERMINE HYDROCHLORIDE 37.5 MG/1
37.5 CAPSULE ORAL EVERY MORNING
Qty: 30 CAPSULE | Refills: 0 | Status: SHIPPED | OUTPATIENT
Start: 2018-10-23 | End: 2018-11-22

## 2018-12-03 ENCOUNTER — OFFICE VISIT (OUTPATIENT)
Dept: FAMILY MEDICINE CLINIC | Age: 38
End: 2018-12-03
Payer: MEDICAID

## 2018-12-03 VITALS
HEART RATE: 95 BPM | BODY MASS INDEX: 40.22 KG/M2 | WEIGHT: 227 LBS | SYSTOLIC BLOOD PRESSURE: 115 MMHG | OXYGEN SATURATION: 100 % | DIASTOLIC BLOOD PRESSURE: 78 MMHG

## 2018-12-03 DIAGNOSIS — E66.01 MORBID OBESITY WITH BMI OF 40.0-44.9, ADULT (HCC): Primary | ICD-10-CM

## 2018-12-03 DIAGNOSIS — K21.9 GASTROESOPHAGEAL REFLUX DISEASE, ESOPHAGITIS PRESENCE NOT SPECIFIED: ICD-10-CM

## 2018-12-03 PROCEDURE — G8484 FLU IMMUNIZE NO ADMIN: HCPCS | Performed by: FAMILY MEDICINE

## 2018-12-03 PROCEDURE — G8427 DOCREV CUR MEDS BY ELIG CLIN: HCPCS | Performed by: FAMILY MEDICINE

## 2018-12-03 PROCEDURE — G8417 CALC BMI ABV UP PARAM F/U: HCPCS | Performed by: FAMILY MEDICINE

## 2018-12-03 PROCEDURE — 1036F TOBACCO NON-USER: CPT | Performed by: FAMILY MEDICINE

## 2018-12-03 PROCEDURE — 99213 OFFICE O/P EST LOW 20 MIN: CPT | Performed by: FAMILY MEDICINE

## 2018-12-03 RX ORDER — PHENTERMINE HYDROCHLORIDE 37.5 MG/1
37.5 TABLET ORAL
Qty: 30 TABLET | Refills: 0 | Status: SHIPPED | OUTPATIENT
Start: 2018-12-03 | End: 2019-01-02

## 2018-12-03 RX ORDER — OMEPRAZOLE 40 MG/1
40 CAPSULE, DELAYED RELEASE ORAL DAILY
Qty: 30 CAPSULE | Refills: 3 | Status: SHIPPED | OUTPATIENT
Start: 2018-12-03 | End: 2019-01-07 | Stop reason: SDUPTHER

## 2018-12-03 RX ORDER — PHENTERMINE HYDROCHLORIDE 37.5 MG/1
37.5 TABLET ORAL
COMMUNITY
End: 2018-12-03 | Stop reason: SDUPTHER

## 2018-12-03 NOTE — PROGRESS NOTES
encounter. Orders Placed This Encounter   Medications    phentermine (ADIPEX-P) 37.5 MG tablet     Sig: Take 1 tablet by mouth every morning (before breakfast) for 30 days. .     Dispense:  30 tablet     Refill:  0    omeprazole (PRILOSEC) 40 MG delayed release capsule     Sig: Take 1 capsule by mouth daily     Dispense:  30 capsule     Refill:  3       Electronically signed by Pastor Brandt MD on 12/6/2018 at 6:34 AM       (Please note that portions of this note were completed with a voice recognition program. Efforts were made to edit the dictations but occasionally words are mis-transcribed.)

## 2019-01-07 ENCOUNTER — OFFICE VISIT (OUTPATIENT)
Dept: FAMILY MEDICINE CLINIC | Age: 39
End: 2019-01-07
Payer: MEDICAID

## 2019-01-07 VITALS
BODY MASS INDEX: 39.51 KG/M2 | WEIGHT: 223 LBS | SYSTOLIC BLOOD PRESSURE: 105 MMHG | HEART RATE: 100 BPM | DIASTOLIC BLOOD PRESSURE: 69 MMHG

## 2019-01-07 DIAGNOSIS — E66.09 CLASS 2 OBESITY DUE TO EXCESS CALORIES WITHOUT SERIOUS COMORBIDITY WITH BODY MASS INDEX (BMI) OF 39.0 TO 39.9 IN ADULT: Primary | ICD-10-CM

## 2019-01-07 DIAGNOSIS — K21.9 GASTROESOPHAGEAL REFLUX DISEASE, ESOPHAGITIS PRESENCE NOT SPECIFIED: ICD-10-CM

## 2019-01-07 PROCEDURE — G8484 FLU IMMUNIZE NO ADMIN: HCPCS | Performed by: FAMILY MEDICINE

## 2019-01-07 PROCEDURE — 99213 OFFICE O/P EST LOW 20 MIN: CPT | Performed by: FAMILY MEDICINE

## 2019-01-07 PROCEDURE — G8427 DOCREV CUR MEDS BY ELIG CLIN: HCPCS | Performed by: FAMILY MEDICINE

## 2019-01-07 PROCEDURE — 1036F TOBACCO NON-USER: CPT | Performed by: FAMILY MEDICINE

## 2019-01-07 PROCEDURE — G8417 CALC BMI ABV UP PARAM F/U: HCPCS | Performed by: FAMILY MEDICINE

## 2019-01-07 RX ORDER — OMEPRAZOLE 40 MG/1
40 CAPSULE, DELAYED RELEASE ORAL DAILY
Qty: 30 CAPSULE | Refills: 3 | Status: SHIPPED | OUTPATIENT
Start: 2019-01-07 | End: 2019-06-10 | Stop reason: SDUPTHER

## 2019-01-07 RX ORDER — PHENTERMINE HYDROCHLORIDE 37.5 MG/1
37.5 TABLET ORAL
Qty: 30 TABLET | Refills: 0 | Status: SHIPPED | OUTPATIENT
Start: 2019-01-07 | End: 2019-02-06

## 2019-01-08 ASSESSMENT — PATIENT HEALTH QUESTIONNAIRE - PHQ9
SUM OF ALL RESPONSES TO PHQ QUESTIONS 1-9: 0
SUM OF ALL RESPONSES TO PHQ9 QUESTIONS 1 & 2: 0
SUM OF ALL RESPONSES TO PHQ QUESTIONS 1-9: 0
1. LITTLE INTEREST OR PLEASURE IN DOING THINGS: 0
2. FEELING DOWN, DEPRESSED OR HOPELESS: 0

## 2019-06-10 DIAGNOSIS — K21.9 GASTROESOPHAGEAL REFLUX DISEASE, ESOPHAGITIS PRESENCE NOT SPECIFIED: ICD-10-CM

## 2019-06-10 RX ORDER — OMEPRAZOLE 40 MG/1
40 CAPSULE, DELAYED RELEASE ORAL DAILY
Qty: 30 CAPSULE | Refills: 3 | Status: SHIPPED | OUTPATIENT
Start: 2019-06-10 | End: 2020-07-07 | Stop reason: SDUPTHER

## 2019-08-21 ENCOUNTER — OFFICE VISIT (OUTPATIENT)
Dept: FAMILY MEDICINE CLINIC | Age: 39
End: 2019-08-21
Payer: MEDICAID

## 2019-08-21 VITALS
WEIGHT: 246 LBS | BODY MASS INDEX: 43.59 KG/M2 | HEART RATE: 77 BPM | OXYGEN SATURATION: 96 % | SYSTOLIC BLOOD PRESSURE: 103 MMHG | DIASTOLIC BLOOD PRESSURE: 71 MMHG

## 2019-08-21 DIAGNOSIS — Z00.01 ENCOUNTER FOR WELL ADULT EXAM WITH ABNORMAL FINDINGS: Primary | ICD-10-CM

## 2019-08-21 DIAGNOSIS — E66.01 CLASS 3 SEVERE OBESITY DUE TO EXCESS CALORIES WITH SERIOUS COMORBIDITY AND BODY MASS INDEX (BMI) OF 40.0 TO 44.9 IN ADULT (HCC): ICD-10-CM

## 2019-08-21 DIAGNOSIS — R25.2 MUSCLE CRAMPS: ICD-10-CM

## 2019-08-21 PROCEDURE — G8417 CALC BMI ABV UP PARAM F/U: HCPCS | Performed by: FAMILY MEDICINE

## 2019-08-21 PROCEDURE — 1036F TOBACCO NON-USER: CPT | Performed by: FAMILY MEDICINE

## 2019-08-21 PROCEDURE — 99395 PREV VISIT EST AGE 18-39: CPT | Performed by: FAMILY MEDICINE

## 2019-08-21 PROCEDURE — 99213 OFFICE O/P EST LOW 20 MIN: CPT | Performed by: FAMILY MEDICINE

## 2019-08-21 PROCEDURE — G8427 DOCREV CUR MEDS BY ELIG CLIN: HCPCS | Performed by: FAMILY MEDICINE

## 2019-08-21 RX ORDER — PHENTERMINE HYDROCHLORIDE 37.5 MG/1
37.5 TABLET ORAL
Qty: 30 TABLET | Refills: 0 | Status: SHIPPED | OUTPATIENT
Start: 2019-08-21 | End: 2019-09-20

## 2019-08-21 RX ORDER — CYCLOBENZAPRINE HCL 5 MG
5 TABLET ORAL NIGHTLY PRN
Qty: 30 TABLET | Refills: 0 | Status: SHIPPED | OUTPATIENT
Start: 2019-08-21 | End: 2020-07-07 | Stop reason: SDUPTHER

## 2019-08-21 NOTE — PROGRESS NOTES
use: Yes     Alcohol/week: 0.0 standard drinks     Comment: socially    Drug use: No    Sexual activity: Yes     Partners: Male   Lifestyle    Physical activity:     Days per week: Not on file     Minutes per session: Not on file    Stress: Not on file   Relationships    Social connections:     Talks on phone: Not on file     Gets together: Not on file     Attends Faith service: Not on file     Active member of club or organization: Not on file     Attends meetings of clubs or organizations: Not on file     Relationship status: Not on file    Intimate partner violence:     Fear of current or ex partner: Not on file     Emotionally abused: Not on file     Physically abused: Not on file     Forced sexual activity: Not on file   Other Topics Concern    Not on file   Social History Narrative    Not on file     Current Outpatient Medications   Medication Sig Dispense Refill    phentermine (ADIPEX-P) 37.5 MG tablet Take 1 tablet by mouth every morning (before breakfast) for 30 days. 30 tablet 0    cyclobenzaprine (FLEXERIL) 5 MG tablet Take 1 tablet by mouth nightly as needed for Muscle spasms 30 tablet 0    omeprazole (PRILOSEC) 40 MG delayed release capsule Take 1 capsule by mouth daily 30 capsule 3    triamcinolone (KENALOG) 0.1 % cream Apply topically 2 times daily. 30 g 0    metFORMIN (GLUCOPHAGE) 500 MG tablet TAKE ONE TABLET BY MOUTH TWICE A DAY WITH MEALS 60 tablet 2    ibuprofen (ADVIL;MOTRIN) 800 MG tablet Take 1 tablet by mouth every 6 hours as needed for Pain 120 tablet 1    Multiple Vitamin (MULTI VITAMIN DAILY PO) Take by mouth       No current facility-administered medications for this visit. Current Outpatient Medications on File Prior to Visit   Medication Sig Dispense Refill    omeprazole (PRILOSEC) 40 MG delayed release capsule Take 1 capsule by mouth daily 30 capsule 3    triamcinolone (KENALOG) 0.1 % cream Apply topically 2 times daily.  30 g 0    metFORMIN (GLUCOPHAGE) 500 MG tablet TAKE ONE TABLET BY MOUTH TWICE A DAY WITH MEALS 60 tablet 2    ibuprofen (ADVIL;MOTRIN) 800 MG tablet Take 1 tablet by mouth every 6 hours as needed for Pain 120 tablet 1    Multiple Vitamin (MULTI VITAMIN DAILY PO) Take by mouth       No current facility-administered medications on file prior to visit. Allergies   Allergen Reactions    Bee Venom        Do you take any herbs or supplements that were not prescribed by a doctor? no  Are you taking calcium supplements? not applicable  Are you taking aspirin daily? not applicable    Review of Systems  Do you have pain that bothers you in your daily life? no  Review of Systems   Constitutional: Negative for fever and unexpected weight change. HENT: Negative for ear pain, congestion, sore throat and rhinorrhea. Eyes: Negative for itching and visual disturbance. Respiratory: Negative for cough and shortness of breath. Cardiovascular: Negative for chest pain and leg swelling. Gastrointestinal: Negative for diarrhea, constipation and blood in stool. Endocrine: Negative for polydipsia and polyuria. Genitourinary: Negative for dysuria and hematuria. Musculoskeletal: Negative for back pain and gait problem. Positive for muscle aches on and off. And lower back. Skin: Negative for color change and rash. Neurological: Negative for dizziness and headaches. Psychiatric/Behavioral: Negative for confusion and agitation. Objective:   HENT:   /71   Pulse 77   Wt 246 lb (111.6 kg)   SpO2 96%   BMI 43.59 kg/m²   Constitutional: Alert and oriented. Well-nourished. No distress. Head: Normocephalic and atraumatic. Right Ear: External ear normal. TM: no bulging, erythema or fluid seen. Left Ear: External ear normal. TM: no bulging, erythema or fluid seen. Nose: Nose normal.   Mouth/Throat: Oropharynx is clear and moist. teeth in good repair. Eyes: Pupils are equal, round, and reactive to light. Right eye exhibits no discharge.

## 2019-12-19 ENCOUNTER — HOSPITAL ENCOUNTER (EMERGENCY)
Age: 39
Discharge: HOME | End: 2019-12-19
Attending: EMERGENCY MEDICINE
Payer: MEDICAID

## 2019-12-19 ENCOUNTER — APPOINTMENT (OUTPATIENT)
Dept: GENERAL RADIOLOGY | Age: 39
End: 2019-12-19
Payer: MEDICAID

## 2019-12-19 VITALS
WEIGHT: 215 LBS | SYSTOLIC BLOOD PRESSURE: 116 MMHG | DIASTOLIC BLOOD PRESSURE: 62 MMHG | TEMPERATURE: 99.2 F | HEART RATE: 92 BPM | HEIGHT: 64 IN | BODY MASS INDEX: 36.7 KG/M2 | OXYGEN SATURATION: 100 % | RESPIRATION RATE: 18 BRPM

## 2019-12-19 DIAGNOSIS — J06.9 VIRAL URI WITH COUGH: Primary | ICD-10-CM

## 2019-12-19 DIAGNOSIS — R07.9 CHEST PAIN, UNSPECIFIED TYPE: ICD-10-CM

## 2019-12-19 LAB
ABSOLUTE EOS #: 0.38 K/UL (ref 0–0.44)
ABSOLUTE IMMATURE GRANULOCYTE: <0.03 K/UL (ref 0–0.3)
ABSOLUTE LYMPH #: 1.48 K/UL (ref 1.1–3.7)
ABSOLUTE MONO #: 1.02 K/UL (ref 0.1–1.2)
ANION GAP SERPL CALCULATED.3IONS-SCNC: 12 MMOL/L (ref 9–17)
BASOPHILS # BLD: 1 % (ref 0–2)
BASOPHILS ABSOLUTE: 0.05 K/UL (ref 0–0.2)
BUN BLDV-MCNC: 9 MG/DL (ref 6–20)
BUN/CREAT BLD: ABNORMAL (ref 9–20)
CALCIUM SERPL-MCNC: 8.4 MG/DL (ref 8.6–10.4)
CHLORIDE BLD-SCNC: 104 MMOL/L (ref 98–107)
CO2: 20 MMOL/L (ref 20–31)
CREAT SERPL-MCNC: 0.88 MG/DL (ref 0.5–0.9)
D-DIMER QUANTITATIVE: 0.49 MG/L FEU
DIFFERENTIAL TYPE: ABNORMAL
EOSINOPHILS RELATIVE PERCENT: 6 % (ref 1–4)
GFR AFRICAN AMERICAN: >60 ML/MIN
GFR NON-AFRICAN AMERICAN: >60 ML/MIN
GFR SERPL CREATININE-BSD FRML MDRD: ABNORMAL ML/MIN/{1.73_M2}
GFR SERPL CREATININE-BSD FRML MDRD: ABNORMAL ML/MIN/{1.73_M2}
GLUCOSE BLD-MCNC: 100 MG/DL (ref 70–99)
HCG QUALITATIVE: NEGATIVE
HCT VFR BLD CALC: 46 % (ref 36.3–47.1)
HEMOGLOBIN: 15.8 G/DL (ref 11.9–15.1)
IMMATURE GRANULOCYTES: 0 %
LYMPHOCYTES # BLD: 25 % (ref 24–43)
MCH RBC QN AUTO: 31.2 PG (ref 25.2–33.5)
MCHC RBC AUTO-ENTMCNC: 34.3 G/DL (ref 28.4–34.8)
MCV RBC AUTO: 90.9 FL (ref 82.6–102.9)
MONOCYTES # BLD: 17 % (ref 3–12)
NRBC AUTOMATED: 0 PER 100 WBC
PDW BLD-RTO: 12.9 % (ref 11.8–14.4)
PLATELET # BLD: ABNORMAL K/UL (ref 138–453)
PLATELET ESTIMATE: ABNORMAL
PLATELET, FLUORESCENCE: NORMAL K/UL (ref 138–453)
PMV BLD AUTO: ABNORMAL FL (ref 8.1–13.5)
POTASSIUM SERPL-SCNC: 3.7 MMOL/L (ref 3.7–5.3)
RBC # BLD: 5.06 M/UL (ref 3.95–5.11)
RBC # BLD: ABNORMAL 10*6/UL
SEG NEUTROPHILS: 51 % (ref 36–65)
SEGMENTED NEUTROPHILS ABSOLUTE COUNT: 3.01 K/UL (ref 1.5–8.1)
SODIUM BLD-SCNC: 136 MMOL/L (ref 135–144)
TROPONIN INTERP: NORMAL
TROPONIN T: NORMAL NG/ML
TROPONIN, HIGH SENSITIVITY: <6 NG/L (ref 0–14)
TSH SERPL DL<=0.05 MIU/L-ACNC: 2.53 MIU/L (ref 0.3–5)
WBC # BLD: 6 K/UL (ref 3.5–11.3)
WBC # BLD: ABNORMAL 10*3/UL

## 2019-12-19 PROCEDURE — 85055 RETICULATED PLATELET ASSAY: CPT

## 2019-12-19 PROCEDURE — 84443 ASSAY THYROID STIM HORMONE: CPT

## 2019-12-19 PROCEDURE — 71046 X-RAY EXAM CHEST 2 VIEWS: CPT

## 2019-12-19 PROCEDURE — 84703 CHORIONIC GONADOTROPIN ASSAY: CPT

## 2019-12-19 PROCEDURE — 85025 COMPLETE CBC W/AUTO DIFF WBC: CPT

## 2019-12-19 PROCEDURE — 96374 THER/PROPH/DIAG INJ IV PUSH: CPT

## 2019-12-19 PROCEDURE — 96361 HYDRATE IV INFUSION ADD-ON: CPT

## 2019-12-19 PROCEDURE — 2580000003 HC RX 258: Performed by: EMERGENCY MEDICINE

## 2019-12-19 PROCEDURE — 6370000000 HC RX 637 (ALT 250 FOR IP): Performed by: EMERGENCY MEDICINE

## 2019-12-19 PROCEDURE — 93005 ELECTROCARDIOGRAM TRACING: CPT | Performed by: EMERGENCY MEDICINE

## 2019-12-19 PROCEDURE — 85379 FIBRIN DEGRADATION QUANT: CPT

## 2019-12-19 PROCEDURE — 99285 EMERGENCY DEPT VISIT HI MDM: CPT

## 2019-12-19 PROCEDURE — 6360000002 HC RX W HCPCS: Performed by: EMERGENCY MEDICINE

## 2019-12-19 PROCEDURE — 84484 ASSAY OF TROPONIN QUANT: CPT

## 2019-12-19 PROCEDURE — 80048 BASIC METABOLIC PNL TOTAL CA: CPT

## 2019-12-19 RX ORDER — KETOROLAC TROMETHAMINE 15 MG/ML
15 INJECTION, SOLUTION INTRAMUSCULAR; INTRAVENOUS ONCE
Status: COMPLETED | OUTPATIENT
Start: 2019-12-19 | End: 2019-12-19

## 2019-12-19 RX ORDER — IBUPROFEN 800 MG/1
800 TABLET ORAL EVERY 8 HOURS PRN
Qty: 30 TABLET | Refills: 0 | Status: SHIPPED | OUTPATIENT
Start: 2019-12-19 | End: 2020-07-07

## 2019-12-19 RX ORDER — BENZONATATE 100 MG/1
100 CAPSULE ORAL 3 TIMES DAILY PRN
Qty: 30 CAPSULE | Refills: 0 | Status: SHIPPED | OUTPATIENT
Start: 2019-12-19 | End: 2019-12-26

## 2019-12-19 RX ORDER — 0.9 % SODIUM CHLORIDE 0.9 %
1000 INTRAVENOUS SOLUTION INTRAVENOUS ONCE
Status: COMPLETED | OUTPATIENT
Start: 2019-12-19 | End: 2019-12-19

## 2019-12-19 RX ORDER — BENZONATATE 100 MG/1
200 CAPSULE ORAL ONCE
Status: COMPLETED | OUTPATIENT
Start: 2019-12-19 | End: 2019-12-19

## 2019-12-19 RX ADMIN — SODIUM CHLORIDE 1000 ML: 9 INJECTION, SOLUTION INTRAVENOUS at 21:27

## 2019-12-19 RX ADMIN — KETOROLAC TROMETHAMINE 15 MG: 15 INJECTION, SOLUTION INTRAMUSCULAR; INTRAVENOUS at 21:27

## 2019-12-19 RX ADMIN — BENZONATATE 200 MG: 100 CAPSULE ORAL at 21:27

## 2019-12-19 ASSESSMENT — PAIN DESCRIPTION - DESCRIPTORS: DESCRIPTORS: ACHING

## 2019-12-19 ASSESSMENT — ENCOUNTER SYMPTOMS
SHORTNESS OF BREATH: 0
VOMITING: 1
SORE THROAT: 0
BACK PAIN: 0
ABDOMINAL PAIN: 0

## 2019-12-19 ASSESSMENT — PAIN DESCRIPTION - FREQUENCY: FREQUENCY: CONTINUOUS

## 2019-12-19 ASSESSMENT — PAIN DESCRIPTION - PAIN TYPE: TYPE: ACUTE PAIN

## 2019-12-19 ASSESSMENT — PAIN SCALES - GENERAL
PAINLEVEL_OUTOF10: 8
PAINLEVEL_OUTOF10: 10

## 2019-12-19 ASSESSMENT — PAIN DESCRIPTION - ONSET: ONSET: ON-GOING

## 2019-12-19 ASSESSMENT — PAIN DESCRIPTION - LOCATION: LOCATION: CHEST

## 2019-12-19 ASSESSMENT — PAIN DESCRIPTION - ORIENTATION: ORIENTATION: MID;UPPER

## 2019-12-19 ASSESSMENT — HEART SCORE: ECG: 0

## 2019-12-21 LAB
EKG ATRIAL RATE: 110 BPM
EKG P AXIS: 69 DEGREES
EKG P-R INTERVAL: 118 MS
EKG Q-T INTERVAL: 332 MS
EKG QRS DURATION: 88 MS
EKG QTC CALCULATION (BAZETT): 449 MS
EKG R AXIS: 46 DEGREES
EKG T AXIS: 45 DEGREES
EKG VENTRICULAR RATE: 110 BPM

## 2019-12-21 PROCEDURE — 93010 ELECTROCARDIOGRAM REPORT: CPT | Performed by: INTERNAL MEDICINE

## 2020-06-29 ENCOUNTER — HOSPITAL ENCOUNTER (OUTPATIENT)
Age: 40
Discharge: HOME | End: 2020-06-29
Payer: MEDICAID

## 2020-06-29 LAB
-: ABNORMAL
ABSOLUTE EOS #: 0.12 K/UL (ref 0–0.44)
ABSOLUTE IMMATURE GRANULOCYTE: <0.03 K/UL (ref 0–0.3)
ABSOLUTE LYMPH #: 2.32 K/UL (ref 1.1–3.7)
ABSOLUTE MONO #: 0.5 K/UL (ref 0.1–1.2)
ALBUMIN SERPL-MCNC: 4.2 G/DL (ref 3.5–5.2)
ALBUMIN/GLOBULIN RATIO: 1.4 (ref 1–2.5)
ALP BLD-CCNC: 44 U/L (ref 35–104)
ALT SERPL-CCNC: 17 U/L (ref 5–33)
AMORPHOUS: ABNORMAL
ANION GAP SERPL CALCULATED.3IONS-SCNC: 15 MMOL/L (ref 9–17)
AST SERPL-CCNC: 18 U/L
BACTERIA: ABNORMAL
BASOPHILS # BLD: 1 % (ref 0–2)
BASOPHILS ABSOLUTE: 0.04 K/UL (ref 0–0.2)
BILIRUB SERPL-MCNC: 0.39 MG/DL (ref 0.3–1.2)
BILIRUBIN URINE: NEGATIVE
BUN BLDV-MCNC: 10 MG/DL (ref 6–20)
BUN/CREAT BLD: ABNORMAL (ref 9–20)
CALCIUM SERPL-MCNC: 8.5 MG/DL (ref 8.6–10.4)
CASTS UA: ABNORMAL /LPF (ref 0–8)
CHLORIDE BLD-SCNC: 104 MMOL/L (ref 98–107)
CO2: 19 MMOL/L (ref 20–31)
COLOR: YELLOW
COMMENT UA: ABNORMAL
CREAT SERPL-MCNC: 0.82 MG/DL (ref 0.5–0.9)
CRYSTALS, UA: ABNORMAL /HPF
DIFFERENTIAL TYPE: ABNORMAL
EOSINOPHILS RELATIVE PERCENT: 2 % (ref 1–4)
EPITHELIAL CELLS UA: ABNORMAL /HPF (ref 0–5)
GFR AFRICAN AMERICAN: >60 ML/MIN
GFR NON-AFRICAN AMERICAN: >60 ML/MIN
GFR SERPL CREATININE-BSD FRML MDRD: ABNORMAL ML/MIN/{1.73_M2}
GFR SERPL CREATININE-BSD FRML MDRD: ABNORMAL ML/MIN/{1.73_M2}
GLUCOSE BLD-MCNC: 92 MG/DL (ref 70–99)
GLUCOSE URINE: NEGATIVE
HCT VFR BLD CALC: 44.4 % (ref 36.3–47.1)
HEMOGLOBIN: 14.9 G/DL (ref 11.9–15.1)
IMMATURE GRANULOCYTES: 0 %
KETONES, URINE: NEGATIVE
LEUKOCYTE ESTERASE, URINE: ABNORMAL
LYMPHOCYTES # BLD: 44 % (ref 24–43)
MCH RBC QN AUTO: 31 PG (ref 25.2–33.5)
MCHC RBC AUTO-ENTMCNC: 33.6 G/DL (ref 28.4–34.8)
MCV RBC AUTO: 92.3 FL (ref 82.6–102.9)
MONOCYTES # BLD: 10 % (ref 3–12)
MUCUS: ABNORMAL
NITRITE, URINE: NEGATIVE
NRBC AUTOMATED: 0 PER 100 WBC
OTHER OBSERVATIONS UA: ABNORMAL
PDW BLD-RTO: 12.5 % (ref 11.8–14.4)
PH UA: 7 (ref 5–8)
PLATELET # BLD: 327 K/UL (ref 138–453)
PLATELET ESTIMATE: ABNORMAL
PMV BLD AUTO: 8.8 FL (ref 8.1–13.5)
POTASSIUM SERPL-SCNC: 4.1 MMOL/L (ref 3.7–5.3)
PROTEIN UA: NEGATIVE
RBC # BLD: 4.81 M/UL (ref 3.95–5.11)
RBC # BLD: ABNORMAL 10*6/UL
RBC UA: ABNORMAL /HPF (ref 0–4)
RENAL EPITHELIAL, UA: ABNORMAL /HPF
SEG NEUTROPHILS: 43 % (ref 36–65)
SEGMENTED NEUTROPHILS ABSOLUTE COUNT: 2.27 K/UL (ref 1.5–8.1)
SODIUM BLD-SCNC: 138 MMOL/L (ref 135–144)
SPECIFIC GRAVITY UA: 1.02 (ref 1–1.03)
TOTAL PROTEIN: 7.3 G/DL (ref 6.4–8.3)
TRICHOMONAS: ABNORMAL
TSH SERPL DL<=0.05 MIU/L-ACNC: 2.31 MIU/L (ref 0.3–5)
TURBIDITY: CLEAR
URINE HGB: NEGATIVE
UROBILINOGEN, URINE: NORMAL
WBC # BLD: 5.3 K/UL (ref 3.5–11.3)
WBC # BLD: ABNORMAL 10*3/UL
WBC UA: ABNORMAL /HPF (ref 0–5)
YEAST: ABNORMAL

## 2020-06-29 PROCEDURE — 81001 URINALYSIS AUTO W/SCOPE: CPT

## 2020-06-29 PROCEDURE — 80053 COMPREHEN METABOLIC PANEL: CPT

## 2020-06-29 PROCEDURE — 84443 ASSAY THYROID STIM HORMONE: CPT

## 2020-06-29 PROCEDURE — 36415 COLL VENOUS BLD VENIPUNCTURE: CPT

## 2020-06-29 PROCEDURE — 85025 COMPLETE CBC W/AUTO DIFF WBC: CPT

## 2020-07-07 ENCOUNTER — OFFICE VISIT (OUTPATIENT)
Dept: FAMILY MEDICINE CLINIC | Age: 40
End: 2020-07-07
Payer: MEDICAID

## 2020-07-07 VITALS
WEIGHT: 237 LBS | OXYGEN SATURATION: 98 % | HEART RATE: 100 BPM | TEMPERATURE: 97.1 F | DIASTOLIC BLOOD PRESSURE: 66 MMHG | SYSTOLIC BLOOD PRESSURE: 106 MMHG | BODY MASS INDEX: 41 KG/M2

## 2020-07-07 PROCEDURE — G8427 DOCREV CUR MEDS BY ELIG CLIN: HCPCS | Performed by: FAMILY MEDICINE

## 2020-07-07 PROCEDURE — G8417 CALC BMI ABV UP PARAM F/U: HCPCS | Performed by: FAMILY MEDICINE

## 2020-07-07 PROCEDURE — 99395 PREV VISIT EST AGE 18-39: CPT | Performed by: FAMILY MEDICINE

## 2020-07-07 PROCEDURE — 99213 OFFICE O/P EST LOW 20 MIN: CPT | Performed by: FAMILY MEDICINE

## 2020-07-07 PROCEDURE — 1036F TOBACCO NON-USER: CPT | Performed by: FAMILY MEDICINE

## 2020-07-07 RX ORDER — IBUPROFEN 800 MG/1
800 TABLET ORAL EVERY 6 HOURS PRN
Qty: 120 TABLET | Refills: 1 | Status: SHIPPED | OUTPATIENT
Start: 2020-07-07 | End: 2021-08-30 | Stop reason: SDUPTHER

## 2020-07-07 RX ORDER — CYCLOBENZAPRINE HCL 5 MG
5 TABLET ORAL NIGHTLY PRN
Qty: 30 TABLET | Refills: 0 | Status: SHIPPED | OUTPATIENT
Start: 2020-07-07 | End: 2021-08-30 | Stop reason: SDUPTHER

## 2020-07-07 RX ORDER — PHENTERMINE HYDROCHLORIDE 37.5 MG/1
37.5 TABLET ORAL
Qty: 30 TABLET | Refills: 0 | Status: SHIPPED | OUTPATIENT
Start: 2020-07-07 | End: 2020-08-06

## 2020-07-07 RX ORDER — OMEPRAZOLE 40 MG/1
40 CAPSULE, DELAYED RELEASE ORAL DAILY
Qty: 30 CAPSULE | Refills: 3 | Status: SHIPPED | OUTPATIENT
Start: 2020-07-07 | End: 2020-09-30 | Stop reason: SDUPTHER

## 2020-07-07 ASSESSMENT — PATIENT HEALTH QUESTIONNAIRE - PHQ9
SUM OF ALL RESPONSES TO PHQ QUESTIONS 1-9: 0
SUM OF ALL RESPONSES TO PHQ QUESTIONS 1-9: 0
2. FEELING DOWN, DEPRESSED OR HOPELESS: 0
SUM OF ALL RESPONSES TO PHQ9 QUESTIONS 1 & 2: 0
1. LITTLE INTEREST OR PLEASURE IN DOING THINGS: 0

## 2020-07-07 NOTE — PROGRESS NOTES
Subjective:       Maxine Cisneros is a 42 y.o. female and is here for a comprehensive physical exam.  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 Had to go to behavioral health. Diet: not a lot of time to eat. But when eating fast food. Exercises: started. Would like to be tested for COVID bc of exposure and she was quite sick in the end of the year 2019.  History:  Any STD's in the past? none  Past Medical History:   Diagnosis Date    Anxiety     Dizziness     Gall bladder pain     Headache(784.0)     HSV-1 (herpes simplex virus 1) infection     Hyperthyroidism      Patient Active Problem List    Diagnosis Date Noted    Gastroesophageal reflux disease without esophagitis 10/03/2017    PCOS (polycystic ovarian syndrome) 09/15/2016    Menorrhagia with irregular cycle 12/07/2015    Abnormal uterine bleeding (AUB) 09/24/2014     Past Surgical History:   Procedure Laterality Date    ENDOMETRIAL BIOPSY  08/09/2016    negative atypia or malignancy    INTRAUTERINE DEVICE INSERTION  01/13/2017    Mirena     Family History   Problem Relation Age of Onset    Lupus Father     Stroke Father     Alcohol Abuse Father     Alcohol Abuse Mother     Thyroid Disease Mother      Social History     Socioeconomic History    Marital status: Single     Spouse name: Not on file    Number of children: Not on file    Years of education: Not on file    Highest education level: Not on file   Occupational History    Not on file   Social Needs    Financial resource strain: Not on file    Food insecurity     Worry: Not on file     Inability: Not on file    Transportation needs     Medical: Not on file     Non-medical: Not on file   Tobacco Use    Smoking status: Former Smoker    Smokeless tobacco: Never Used   Substance and Sexual Activity    Alcohol use:  Yes     Alcohol/week: 0.0 standard drinks no    Review of Systems  Do you have pain that bothers you in your daily life? no  Review of Systems   Constitutional: Negative for fever and unexpected weight change. HENT: Negative for ear pain, congestion, sore throat and rhinorrhea. Eyes: Negative for itching and visual disturbance. Respiratory: Negative for cough and shortness of breath. Cardiovascular: Negative for chest pain and leg swelling. Gastrointestinal: Negative for diarrhea, constipation and blood in stool. Endocrine: Negative for polydipsia and polyuria. Genitourinary: Negative for dysuria and hematuria. Musculoskeletal: Negative for back pain and gait problem. Skin: Negative for color change and rash. Neurological: Negative for dizziness and headaches. Psychiatric/Behavioral: Negative for confusion and agitation. Objective:   HENT:   /66   Pulse 100   Temp 97.1 °F (36.2 °C) (Temporal)   Wt 237 lb (107.5 kg)   SpO2 98%   BMI 41.00 kg/m²   Constitutional: Alert and oriented. Well-nourished. No distress. morbid obese. Head: Normocephalic and atraumatic. Right Ear: External ear normal. TM: no bulging, erythema or fluid seen. Left Ear: External ear normal. TM: no bulging, erythema or fluid seen. Nose: Nose normal.   Mouth/Throat: Oropharynx is clear and moist. teeth in good repair. Eyes: Pupils are equal, round, and reactive to light. Right eye exhibits no discharge. Left eye exhibits no discharge. No scleral icterus. Neck: Normal range of motion. Neck supple. No JVD present. No tracheal deviation present. No thyromegaly present. Cardiovascular: Normal rate, regular rhythm, normal heart sounds. Pulmonary/Chest: Effort normal and breath sounds normal. No respiratory distress. She has no wheezes. She has no rales. Abdominal: Soft. Bowel sounds are normal.  She exhibits no distension and no mass. There is no tenderness. There is no rebound and no guarding. Musculoskeletal: Normal range of motion. She exhibits no edema or tenderness. Lymphadenopathy:    She has no cervical adenopathy. Neurological:  She is alert and oriented to person, place, and time. Cranial nerves grossly intact. No sensation problem noted. Muscle strength 5/5 throughout. Skin: Skin is warm and dry. No rash noted. No erythema. Psychiatric:  She has a normal mood and affect. Behavior is normal.    Deandra was seen today for weight loss. Diagnoses and all orders for this visit:    Encounter for well adult exam with abnormal findings    Muscle cramps  -     cyclobenzaprine (FLEXERIL) 5 MG tablet; Take 1 tablet by mouth nightly as needed for Muscle spasms    Muscle tension headache  -     ibuprofen (ADVIL;MOTRIN) 800 MG tablet; Take 1 tablet by mouth every 6 hours as needed for Pain    Cervical sprain, initial encounter  -     ibuprofen (ADVIL;MOTRIN) 800 MG tablet; Take 1 tablet by mouth every 6 hours as needed for Pain    PCOS (polycystic ovarian syndrome)  -     metFORMIN (GLUCOPHAGE) 500 MG tablet; TAKE ONE TABLET BY MOUTH TWICE A DAY WITH MEALS    Gastroesophageal reflux disease, esophagitis presence not specified  -     omeprazole (PRILOSEC) 40 MG delayed release capsule; Take 1 capsule by mouth daily    Calcium blood decreased  -     PTH, INTACT WITH IONIZED CALCIUM; Future    Morbid obesity with BMI of 40.0-44.9, adult (HCC)  -     phentermine (ADIPEX-P) 37.5 MG tablet; Take 1 tablet by mouth every morning (before breakfast) for 30 days. Viral infection  -     Covid-19, Antibody, Total; Future    blood work reviewed. Medication refill. Discussed with patient again exercise and diet. See me back in 4 weeks. Call or return to clinic prn if these symptoms worsen or fail to improve as anticipated. I have reviewed the instructions with the patient, answering all questions to her satisfaction.       Patient Counseling:  --Nutrition: Stressed importance of moderation in sodium/caffeine intake, saturated fat and cholesterol, caloric balance, sufficient intake of fresh fruits, vegetables, fiber, calcium, iron, and 1 mg of folate supplement per day (for females capable of pregnancy). --Exercise: Stressed the importance of regular exercise. --Substance Abuse: Discussed cessation/primary prevention of tobacco, alcohol, or other drug use; driving or other dangerous activities under the influence; availability of treatment for abuse. --Sexuality: Discussed sexually transmitted diseases, partner selection, use of condoms, avoidance of unintended pregnancy  and contraceptive alternatives. --Injury prevention: Discussed safety belts, safety helmets, smoke detector, smoking near bedding or upholstery. --Dental health: Discussed importance of regular tooth brushing, flossing, and dental visits. --Immunizations reviewed. --After hours service discussed with patient    Nacori received counseling on the following healthy behaviors: nutrition, exercise and medication adherence  Reviewed prior labs and health maintenance  Continue current medications, diet and exercise. Discussed use, benefit, and side effects of prescribed medications. Barriers to medication compliance addressed. Patient given educational materials - see patient instructions  Was a self-tracking handout given in paper form or via U[employer]?  No: .    Requested Prescriptions     Signed Prescriptions Disp Refills    cyclobenzaprine (FLEXERIL) 5 MG tablet 30 tablet 0     Sig: Take 1 tablet by mouth nightly as needed for Muscle spasms    ibuprofen (ADVIL;MOTRIN) 800 MG tablet 120 tablet 1     Sig: Take 1 tablet by mouth every 6 hours as needed for Pain    metFORMIN (GLUCOPHAGE) 500 MG tablet 60 tablet 2     Sig: TAKE ONE TABLET BY MOUTH TWICE A DAY WITH MEALS    omeprazole (PRILOSEC) 40 MG delayed release capsule 30 capsule 3     Sig: Take 1 capsule by mouth daily    phentermine (ADIPEX-P) 37.5 MG tablet 30 tablet 0     Sig: Take 1 tablet by mouth every morning (before breakfast) for 30 days. All patient questions answered. Patient voiced understanding. Quality Measures    Body mass index is 41 kg/m². Elevated. Weight control planned discussed medically supervised diet with primary care physician, daily exercise regimen and Healthy diet and regular exercise. BP: 106/66 Blood pressure is normal. Treatment plan consists of No treatment change needed.     Lab Results   Component Value Date    LDLCHOLESTEROL 130 02/07/2018    (goal LDL reduction with dx if diabetes is 50% LDL reduction)      PHQ Scores 7/7/2020 1/8/2019 1/25/2018 7/12/2017   PHQ2 Score 0 0 0 1   PHQ9 Score 0 0 0 1     Interpretation of Total Score Depression Severity: 1-4 = Minimal depression, 5-9 = Mild depression, 10-14 = Moderate depression, 15-19 = Moderately severe depression, 20-27 = Severe depression     Follow up in 4 weeks     (Please note that portions of this note were completed with a voice recognition program. Efforts were made to edit the dictations but occasionally words are mis-transcribed.)

## 2020-07-16 ENCOUNTER — HOSPITAL ENCOUNTER (OUTPATIENT)
Age: 40
Discharge: HOME | End: 2020-07-16
Payer: MEDICAID

## 2020-07-16 LAB
CALCIUM IONIZED: 1.28 MMOL/L (ref 1.13–1.33)
PTH INTACT: 37.91 PG/ML (ref 15–65)
SARS-COV-2 ANTIBODY, TOTAL: NEGATIVE

## 2020-07-16 PROCEDURE — 82330 ASSAY OF CALCIUM: CPT

## 2020-07-16 PROCEDURE — 86769 SARS-COV-2 COVID-19 ANTIBODY: CPT

## 2020-07-16 PROCEDURE — 83970 ASSAY OF PARATHORMONE: CPT

## 2020-07-16 PROCEDURE — 36415 COLL VENOUS BLD VENIPUNCTURE: CPT

## 2020-08-07 ENCOUNTER — E-VISIT (OUTPATIENT)
Dept: FAMILY MEDICINE CLINIC | Age: 40
End: 2020-08-07

## 2020-08-21 ENCOUNTER — TELEMEDICINE (OUTPATIENT)
Dept: FAMILY MEDICINE CLINIC | Age: 40
End: 2020-08-21
Payer: MEDICAID

## 2020-08-21 PROCEDURE — G8427 DOCREV CUR MEDS BY ELIG CLIN: HCPCS | Performed by: FAMILY MEDICINE

## 2020-08-21 PROCEDURE — 99213 OFFICE O/P EST LOW 20 MIN: CPT | Performed by: FAMILY MEDICINE

## 2020-08-21 RX ORDER — PHENTERMINE HYDROCHLORIDE 37.5 MG/1
37.5 TABLET ORAL
Qty: 30 TABLET | Refills: 0 | Status: SHIPPED | OUTPATIENT
Start: 2020-08-21 | End: 2020-09-20

## 2020-08-21 ASSESSMENT — PATIENT HEALTH QUESTIONNAIRE - PHQ9
2. FEELING DOWN, DEPRESSED OR HOPELESS: 0
SUM OF ALL RESPONSES TO PHQ QUESTIONS 1-9: 0
SUM OF ALL RESPONSES TO PHQ QUESTIONS 1-9: 0
SUM OF ALL RESPONSES TO PHQ9 QUESTIONS 1 & 2: 0
1. LITTLE INTEREST OR PLEASURE IN DOING THINGS: 0

## 2020-08-21 NOTE — PROGRESS NOTES
General FM note    Ana Paula Anderson is a 42 y.o. female who presents today for follow up on her  medical conditions as noted below. Ana Paula Anderson is c/o of No chief complaint on file. Patient Active Problem List:     Abnormal uterine bleeding (AUB)     Menorrhagia with irregular cycle     PCOS (polycystic ovarian syndrome)     Gastroesophageal reflux disease without esophagitis     Past Medical History:   Diagnosis Date    Anxiety     Dizziness     Gall bladder pain     Headache(784.0)     HSV-1 (herpes simplex virus 1) infection     Hyperthyroidism       Past Surgical History:   Procedure Laterality Date    ENDOMETRIAL BIOPSY  08/09/2016    negative atypia or malignancy    INTRAUTERINE DEVICE INSERTION  01/13/2017    Mirena     Family History   Problem Relation Age of Onset    Lupus Father     Stroke Father     Alcohol Abuse Father     Alcohol Abuse Mother     Thyroid Disease Mother      Current Outpatient Medications   Medication Sig Dispense Refill    phentermine (ADIPEX-P) 37.5 MG tablet Take 1 tablet by mouth every morning (before breakfast) for 30 days. 30 tablet 0    cyclobenzaprine (FLEXERIL) 5 MG tablet Take 1 tablet by mouth nightly as needed for Muscle spasms 30 tablet 0    ibuprofen (ADVIL;MOTRIN) 800 MG tablet Take 1 tablet by mouth every 6 hours as needed for Pain 120 tablet 1    metFORMIN (GLUCOPHAGE) 500 MG tablet TAKE ONE TABLET BY MOUTH TWICE A DAY WITH MEALS 60 tablet 2    omeprazole (PRILOSEC) 40 MG delayed release capsule Take 1 capsule by mouth daily 30 capsule 3    Multiple Vitamin (MULTI VITAMIN DAILY PO) Take by mouth       No current facility-administered medications for this visit. ALLERGIES:    Allergies   Allergen Reactions    Bee Venom        Social History     Tobacco Use    Smoking status: Former Smoker    Smokeless tobacco: Never Used   Substance Use Topics    Alcohol use:  Yes     Alcohol/week: 0.0 standard drinks     Comment: socially      There is no height or weight on file to calculate BMI. There were no vitals taken for this visit. Subjective:      HPI    29-year-old female reaching out today per telemedicine visit. The patient started Adipex in the beginning of July and she states that she was not able to make an appointment. Lost 13 lbs weight loss with the 1. Script. No sugars. A lot of water. Low carbs, no fast food. Exercises: not a lot different. More active. Denies any side effects    Would also like to have another referral to a gastroenterologist for an upper scope. The patient has very bad acid reflux. She is on omeprazole. But she states that she has to take this medication 24 hours on it. otherwise she has severe acid reflux. Review of Systems   Constitutional: Negative for fever and unexpected weight change. Respiratory: Negative for cough and shortness of breath. Cardiovascular: Negative for chest pain and leg swelling. Gastrointestinal: Negative for diarrhea, constipation and blood in stool. Positive for acid reflux. Musculoskeletal: Negative for back pain and gait problem. Skin: Negative for color change and rash. Neurological: Negative for dizziness and headaches. Psychiatric/Behavioral: Negative for confusion and agitation. Objective:   Physical Exam  General appearance: normal development, habitus and attention, no deformities. No distress. Pulmo: normal breathing pattern. Psychiatric: alert and oriented to place, time and person. Normal mood and affect. Assessment:       Diagnosis Orders   1. Morbid obesity with BMI of 40.0-44.9, adult (HCC)  phentermine (ADIPEX-P) 37.5 MG tablet   2. Gastroesophageal reflux disease without esophagitis  [facility] - Charlie Grajeda MD, Gastroenterology, [facility]:   I told the patient I do not believe this medication will be refilled. She is in a wonderful diet now she should continue.   Discussed with her me to start her on the Topamax which also could not use Requested Specialty:   Gastroenterology     Number of Visits Requested:   1     Orders Placed This Encounter   Medications    phentermine (ADIPEX-P) 37.5 MG tablet     Sig: Take 1 tablet by mouth every morning (before breakfast) for 30 days.      Dispense:  30 tablet     Refill:  0        (Please note that portions of this note were completed with a voice recognition program. Efforts were made to edit the dictations but occasionally words are mis-transcribed.)

## 2020-08-21 NOTE — PATIENT INSTRUCTIONS
Patient Education        topiramate  Pronunciation:  toe PYRE a mate  Brand:  Qudexy XR Sprinkle, Topamax, Topamax Sprinkle, Trokendi XR  What is the most important information I should know about topiramate? Topiramate may cause vision problems that can be permanent if not treated quickly. Call your doctor right away if you have a sudden decrease in vision. Topiramate may harm an unborn baby. There may be other seizure medicine that can be more safely used during pregnancy. Tell your doctor right away if you become pregnant. Topiramate can increase body temperature and decrease sweating, which may lead to life-threatening dehydration. Tell your doctor if you have decreased sweating, high fever, and hot dry skin. Topiramate can also increase the level of acid in your blood (metabolic acidosis). Tell your doctor if you have  irregular heartbeats, shortness of breath, loss of appetite, or trouble thinking. Some people have thoughts about suicide while taking seizure medicine. Stay alert to changes in your mood or symptoms. Report any new or worsening symptoms to your doctor. Do not stop using topiramate suddenly or you could have increased seizures. What is topiramate? Topiramate is a seizure medicine, also called an anticonvulsant. Topiramate is used to treat seizures in adults and children who are at least 3years old. Trokendi XR is for use in adults and children who are at least 10years old. Some brands of topiramate are also used to prevent migraine headaches in adults and teenagers who are at least 15years old. These medicines will only prevent  migraine headaches or reduce the number of attacks, but will not treat a headache that has already begun. Topiramate may also be used for purposes not listed in this medication guide. What should I discuss with my healthcare provider before taking topiramate? You should not use topiramate if you are allergic to it.  Do not take Trokendi XR within 6 hours before or 6 hours after drinking alcohol. Tell your doctor if you are sick with diarrhea, or if you have ever had:  · glaucoma or other eye problems;  · metabolic acidosis (high levels of acid in your blood);  · kidney disease, kidney stones, or dialysis;  · lung disease, breathing problems;  · mood problems, depression, or suicidal thoughts or actions;  · liver disease;  · a growth disorder; or  · soft or brittle bones (osteoporosis, osteomalacia). Topiramate can increase the level of acid in your blood (metabolic acidosis). This can weaken your bones, cause kidney stones, or cause growth problems in children or harm to an unborn baby. You may need blood tests to make sure you do not have metabolic acidosis, especially if you are pregnant. Some people have thoughts about suicide while taking an anticonvulsant. Your doctor should check your progress at regular visits. Your family or other caregivers should also be alert to changes in your mood or symptoms. Do not start or stop taking topiramate during pregnancy without your doctor's advice. Topiramate may increase the risk of low birth weight and cleft lip and/or cleft palate in a . There may be other seizure medicine that can be more safely used during pregnancy. Tell your doctor right away if you become pregnant. Topiramate can make birth control pills less effective. Use a barrier form of birth control (such as a condom or diaphragm with spermicide) to prevent pregnancy while taking topiramate. It may not be safe to breastfeed a baby while you are using this medicine. Ask your doctor about any risks. How should I take topiramate? Follow all directions on your prescription label and read all medication guides or instruction sheets. Your doctor may occasionally change your dose. Use the medicine exactly as directed. Topiramate can be taken with or without food. Swallow the tablet  whole and do not crush, chew, or break it.  The Trokendi XR extended-release capsule must be swallowed whole. Do not break or open. If you cannot swallow a Qudexy XR or Topamax Sprinkle Capsule whole, open it and sprinkle the medicine into a spoonful of applesauce or other soft food. Swallow the mixture right away without chewing. Do not save it for later use. Carefully follow the swallowing instructions for your medicine. Topiramate doses are sometimes based on weight in children. Your child's dose needs may change if the child gains or loses weight. Drink plenty of liquids while you are taking topiramate, to prevent kidney stones or an electrolyte imbalance. You will need frequent medical tests. If you need surgery, tell the surgeon ahead of time that you are using topiramate. Any medical care provider who treats you should know that you take seizure medication. Do not stop using topiramate suddenly, even if you feel fine. Stopping suddenly may cause increased seizures. Follow your doctor's instructions about tapering your dose. Call your doctor if your seizures get worse or you have them more often while taking topiramate. Store at cool room temperature away from moisture, light, and high heat. What happens if I miss a dose? Take the medicine as soon as you can. Do not take two doses at one time. Skip a missed Topamax  dose if your next dose is due in less than 6 hours. Call your doctor if you have missed more than one dose. What happens if I overdose? Seek emergency medical attention or call the Poison Help line at 1-784.671.5917. An overdose of topiramate can be fatal. Overdose can cause drowsiness, agitation, depression, double vision, thinking problems, problems with speech or coordination, fainting, and seizure (convulsions). What should I avoid while taking topiramate? Do not drink alcohol. Dangerous side effects or increased seizures may occur. Avoid becoming overheated or dehydrated in hot weather.   Topiramate can increase body temperature and of taste;  · feeling nervous;  · nausea, diarrhea, stomach pain, loss of appetite;  · fever, weight loss; or  · cold symptoms such as stuffy nose, sneezing, sore throat. This is not a complete list of side effects and others may occur. Call your doctor for medical advice about side effects. You may report side effects to FDA at 6-309-ACY-4877. What other drugs will affect topiramate? Using topiramate with other drugs that make you drowsy can worsen this effect. Ask your doctor before using opioid medication, a sleeping pill, a muscle relaxer, or medicine for anxiety or depression. Tell your doctor about all your other medicines, especially:  · acetazolamide;  · zonisamide;  · divalproex, valproic acid; or  · birth control pills. This list is not complete. Other drugs may affect topiramate, including prescription and over-the-counter medicines, vitamins, and herbal products. Not all possible drug interactions are listed here. Where can I get more information? Your pharmacist can provide more information about topiramate. Remember, keep this and all other medicines out of the reach of children, never share your medicines with others, and use this medication only for the indication prescribed. Every effort has been made to ensure that the information provided by [address] is accurate, up-to-date, and complete, but no guarantee is made to that effect. Drug information contained herein may be time sensitive. [facility] information has been compiled for use by healthcare practitioners and consumers in the [place] and therefore [facility] does not warrant that uses outside of the [place] are appropriate, unless specifically indicated otherwise. [facility]'s drug information does not endorse drugs, diagnose patients or recommend therapy.  [facility] drug information is an informational resource designed to assist licensed healthcare practitioners in caring for their patients and/or to serve consumers viewing this service as a supplement to, and not a substitute for, the expertise, skill, knowledge and judgment of healthcare practitioners. The absence of a warning for a given drug or drug combination in no way should be construed to indicate that the drug or drug combination is safe, effective or appropriate for any given patient. [facility] does not assume any responsibility for any aspect of healthcare administered with the aid of information [facility] provides. The information contained herein is not intended to cover all possible uses, directions, precautions, warnings, drug interactions, allergic reactions, or adverse effects. If you have questions about the drugs you are taking, check with your doctor, nurse or pharmacist.  Copyright 6597-3874 167  Aidan: 18.01. Revision date: 5/9/2019. Care instructions adapted under license by [facility] ([facility]). If you have questions about a medical condition or this instruction, always ask your healthcare professional. [place] [address] any warranty or liability for your use of this information.

## 2020-09-30 ENCOUNTER — TELEMEDICINE (OUTPATIENT)
Dept: FAMILY MEDICINE CLINIC | Age: 40
End: 2020-09-30
Payer: MEDICAID

## 2020-09-30 PROCEDURE — G8428 CUR MEDS NOT DOCUMENT: HCPCS | Performed by: FAMILY MEDICINE

## 2020-09-30 PROCEDURE — 99213 OFFICE O/P EST LOW 20 MIN: CPT | Performed by: FAMILY MEDICINE

## 2020-09-30 RX ORDER — OMEPRAZOLE 40 MG/1
40 CAPSULE, DELAYED RELEASE ORAL DAILY
Qty: 30 CAPSULE | Refills: 3 | Status: SHIPPED | OUTPATIENT
Start: 2020-09-30 | End: 2021-08-30 | Stop reason: SDUPTHER

## 2020-09-30 RX ORDER — PHENTERMINE HYDROCHLORIDE 37.5 MG/1
37.5 TABLET ORAL
Qty: 30 TABLET | Refills: 0 | Status: SHIPPED | OUTPATIENT
Start: 2020-09-30 | End: 2021-06-23 | Stop reason: SDUPTHER

## 2020-09-30 ASSESSMENT — PATIENT HEALTH QUESTIONNAIRE - PHQ9
SUM OF ALL RESPONSES TO PHQ9 QUESTIONS 1 & 2: 0
SUM OF ALL RESPONSES TO PHQ QUESTIONS 1-9: 0
1. LITTLE INTEREST OR PLEASURE IN DOING THINGS: 0
SUM OF ALL RESPONSES TO PHQ QUESTIONS 1-9: 0
2. FEELING DOWN, DEPRESSED OR HOPELESS: 0

## 2020-09-30 NOTE — PROGRESS NOTES
no height or weight on file to calculate BMI. There were no vitals taken for this visit. Subjective:      HPI    48 yo female reaching out per tele med visit today. Weight: 217 lbs. Lost overall 20 lbs. Diet: eating less, not eating sugar. Not eating late at night. Exercises: more active, but she could do more. SE: none. Dry mouth. Review of Systems   Constitutional: Negative for fever and unexpected weight change. Respiratory: Negative for cough and shortness of breath. Cardiovascular: Negative for chest pain and leg swelling. Gastrointestinal: Negative for diarrhea, constipation and blood in stool. Musculoskeletal: Negative for back pain and gait problem. Skin: Negative for color change and rash. Neurological: Negative for dizziness and headaches. Psychiatric/Behavioral: Negative for confusion and agitation. Objective:   Physical Exam  General appearance: normal development, habitus and attention, no deformities. No distress. Pulmo: normal breathing pattern. Psychiatric: alert and oriented to place, time and person. Normal mood and affect. Assessment:       Diagnosis Orders   1. Morbid obesity with BMI of 40.0-44.9, adult ([facility])  phentermine (ADIPEX-P) 37.5 MG tablet   2. Gastroesophageal reflux disease, esophagitis presence not specified  omeprazole (PRILOSEC) 40 MG delayed release capsule       Plan:   Loosing weight. Last script of adipex provided. Patient will continue current diet and exercise regimen. I discussed with her to exercise at least 30 minutes 5 times a week. Decrease carbohydrate intake. Increase fibers and protein. See me back in 6 monhts for weight check. Call if any changes. Stop Adipex if you have any side effects. Call or return to clinic prn if these symptoms worsen or fail to improve as anticipated. I have reviewed the instructions with the patient, answering all questions to her satisfaction.   Nicolle Luther is a 47 y.o. female being

## 2021-06-16 ENCOUNTER — TELEPHONE (OUTPATIENT)
Dept: FAMILY MEDICINE CLINIC | Age: 41
End: 2021-06-16

## 2021-06-16 NOTE — TELEPHONE ENCOUNTER
I have not seen the patient in the office since September last year. It would be good to see you at some point in the office. Thank you.

## 2021-06-23 ENCOUNTER — TELEMEDICINE (OUTPATIENT)
Dept: FAMILY MEDICINE CLINIC | Age: 41
End: 2021-06-23
Payer: MEDICAID

## 2021-06-23 DIAGNOSIS — E66.01 MORBID OBESITY WITH BMI OF 40.0-44.9, ADULT (HCC): ICD-10-CM

## 2021-06-23 PROCEDURE — 99213 OFFICE O/P EST LOW 20 MIN: CPT | Performed by: FAMILY MEDICINE

## 2021-06-23 PROCEDURE — G8427 DOCREV CUR MEDS BY ELIG CLIN: HCPCS | Performed by: FAMILY MEDICINE

## 2021-06-23 RX ORDER — PHENTERMINE HYDROCHLORIDE 37.5 MG/1
37.5 TABLET ORAL
Qty: 30 TABLET | Refills: 0 | Status: SHIPPED | OUTPATIENT
Start: 2021-06-23 | End: 2021-07-27 | Stop reason: SDUPTHER

## 2021-06-23 SDOH — ECONOMIC STABILITY: FOOD INSECURITY: WITHIN THE PAST 12 MONTHS, THE FOOD YOU BOUGHT JUST DIDN'T LAST AND YOU DIDN'T HAVE MONEY TO GET MORE.: NEVER TRUE

## 2021-06-23 SDOH — ECONOMIC STABILITY: FOOD INSECURITY: WITHIN THE PAST 12 MONTHS, YOU WORRIED THAT YOUR FOOD WOULD RUN OUT BEFORE YOU GOT MONEY TO BUY MORE.: NEVER TRUE

## 2021-06-23 ASSESSMENT — PATIENT HEALTH QUESTIONNAIRE - PHQ9
SUM OF ALL RESPONSES TO PHQ9 QUESTIONS 1 & 2: 0
SUM OF ALL RESPONSES TO PHQ QUESTIONS 1-9: 0
2. FEELING DOWN, DEPRESSED OR HOPELESS: 0
1. LITTLE INTEREST OR PLEASURE IN DOING THINGS: 0
SUM OF ALL RESPONSES TO PHQ QUESTIONS 1-9: 0
SUM OF ALL RESPONSES TO PHQ QUESTIONS 1-9: 0

## 2021-06-23 ASSESSMENT — SOCIAL DETERMINANTS OF HEALTH (SDOH): HOW HARD IS IT FOR YOU TO PAY FOR THE VERY BASICS LIKE FOOD, HOUSING, MEDICAL CARE, AND HEATING?: NOT HARD AT ALL
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
## 2021-07-27 ENCOUNTER — OFFICE VISIT (OUTPATIENT)
Dept: FAMILY MEDICINE CLINIC | Age: 41
End: 2021-07-27
Payer: MEDICAID

## 2021-07-27 VITALS
DIASTOLIC BLOOD PRESSURE: 76 MMHG | HEART RATE: 84 BPM | BODY MASS INDEX: 41.99 KG/M2 | OXYGEN SATURATION: 98 % | SYSTOLIC BLOOD PRESSURE: 110 MMHG | WEIGHT: 237 LBS | HEIGHT: 63 IN | TEMPERATURE: 97.4 F

## 2021-07-27 DIAGNOSIS — E66.01 MORBID OBESITY WITH BMI OF 40.0-44.9, ADULT (HCC): ICD-10-CM

## 2021-07-27 PROCEDURE — G8427 DOCREV CUR MEDS BY ELIG CLIN: HCPCS | Performed by: FAMILY MEDICINE

## 2021-07-27 PROCEDURE — G8417 CALC BMI ABV UP PARAM F/U: HCPCS | Performed by: FAMILY MEDICINE

## 2021-07-27 PROCEDURE — 1036F TOBACCO NON-USER: CPT | Performed by: FAMILY MEDICINE

## 2021-07-27 PROCEDURE — 99213 OFFICE O/P EST LOW 20 MIN: CPT | Performed by: FAMILY MEDICINE

## 2021-07-27 RX ORDER — PHENTERMINE HYDROCHLORIDE 37.5 MG/1
37.5 TABLET ORAL
Qty: 30 TABLET | Refills: 0 | Status: SHIPPED | OUTPATIENT
Start: 2021-07-27 | End: 2021-08-30 | Stop reason: SDUPTHER

## 2021-07-27 ASSESSMENT — PATIENT HEALTH QUESTIONNAIRE - PHQ9
SUM OF ALL RESPONSES TO PHQ QUESTIONS 1-9: 0
1. LITTLE INTEREST OR PLEASURE IN DOING THINGS: 0
SUM OF ALL RESPONSES TO PHQ9 QUESTIONS 1 & 2: 0
SUM OF ALL RESPONSES TO PHQ QUESTIONS 1-9: 0
SUM OF ALL RESPONSES TO PHQ QUESTIONS 1-9: 0
2. FEELING DOWN, DEPRESSED OR HOPELESS: 0

## 2021-07-27 NOTE — PROGRESS NOTES
General FM note    Bladimir Luna is a 47 y.o. female who presents today for follow up on her  medical conditions as noted below. Bladimir Luna is c/o of   Chief Complaint   Patient presents with    1 Month Follow-Up       Patient Active Problem List:     Abnormal uterine bleeding (AUB)     Menorrhagia with irregular cycle     PCOS (polycystic ovarian syndrome)     Gastroesophageal reflux disease without esophagitis     Past Medical History:   Diagnosis Date    Anxiety     Dizziness     Gall bladder pain     Headache(784.0)     HSV-1 (herpes simplex virus 1) infection     Hyperthyroidism       Past Surgical History:   Procedure Laterality Date    ENDOMETRIAL BIOPSY  08/09/2016    negative atypia or malignancy    INTRAUTERINE DEVICE INSERTION  01/13/2017    Mirena     Family History   Problem Relation Age of Onset    Lupus Father     Stroke Father     Alcohol Abuse Father     Alcohol Abuse Mother     Thyroid Disease Mother      Current Outpatient Medications   Medication Sig Dispense Refill    omeprazole (PRILOSEC) 40 MG delayed release capsule Take 1 capsule by mouth daily 30 capsule 3    cyclobenzaprine (FLEXERIL) 5 MG tablet Take 1 tablet by mouth nightly as needed for Muscle spasms 30 tablet 0    ibuprofen (ADVIL;MOTRIN) 800 MG tablet Take 1 tablet by mouth every 6 hours as needed for Pain 120 tablet 1    metFORMIN (GLUCOPHAGE) 500 MG tablet TAKE ONE TABLET BY MOUTH TWICE A DAY WITH MEALS 60 tablet 2    Multiple Vitamin (MULTI VITAMIN DAILY PO) Take by mouth       No current facility-administered medications for this visit. ALLERGIES:    Allergies   Allergen Reactions    Bee Venom        Social History     Tobacco Use    Smoking status: Former Smoker    Smokeless tobacco: Never Used   Substance Use Topics    Alcohol use: Yes     Alcohol/week: 0.0 standard drinks     Comment: socially      Body mass index is 41.98 kg/m².   /76   Pulse 84   Temp 97.4 °F (36.3 °C)   Ht 5' 3\" (1.6 m)   Wt 237 lb (107.5 kg)   SpO2 98%   BMI 41.98 kg/m²     Subjective:      HPI    47 y.o. female coming today for weight check. Lost 11 pounds with the 1. prescription of Adipex. Diet:  Cut out all carbs, no breads, no beans. started to eat healthy fibers. Keto breads. Exercises: 2 times a week Gym -- kettle bells or kick boxing at home 2 times week at home. Side effects: constipation. Review of Systems   Constitutional: Negative for fever and unexpected weight change. Pertinent items are noted in HPI. Objective:   Physical Exam  Constitutional: VS (see above). General appearance: normal development, habitus and attention, no deformities. No distress. Eyes: normal conjunctiva and lids. CAV: RRR, no RMG. No edema lower extremities. Pulmo: CTA bilateral, no CWR. Skin: no rashes, lesions or ulcers. Musculoskeletal: normal gait. Nails: no clubbing or cyanosis. Psychiatric: alert and oriented to place, time and person. Normal mood and affect. Assessment:       Diagnosis Orders   1. Morbid obesity with BMI of 40.0-44.9, adult ([facility])  phentermine (ADIPEX-P) 37.5 MG tablet       Plan:      Sec prescription of Adipex provided. Continue exercise and diet. Follow-up as indicated. No follow-ups on file. No orders of the defined types were placed in this encounter. No orders of the defined types were placed in this encounter. Call or return to clinic prn if these symptoms worsen or fail to improve as anticipated. I have reviewed the instructions with the patient, answering all questions to her satisfaction. Nacori received counseling on the following healthy behaviors: nutrition, exercise and medication adherence  Reviewed prior labs and health maintenance. Continue current medications, diet and exercise. Discussed use, benefit, and side effects of prescribed medications. Barriers to medication compliance addressed.    Patient given educational materials - see patient instructions. All patient questions answered. Patient voiced understanding.       Electronically signed by Delano Perez MD on 7/27/2021 at 4:28 PM       (Please note that portions of this note were completed with a voice recognition program. Efforts were made to edit the dictations but occasionally words are mis-transcribed.)

## 2021-08-16 ENCOUNTER — HOSPITAL ENCOUNTER (EMERGENCY)
Age: 41
Discharge: HOME | End: 2021-08-16
Attending: EMERGENCY MEDICINE
Payer: MEDICAID

## 2021-08-16 ENCOUNTER — APPOINTMENT (OUTPATIENT)
Dept: CT IMAGING | Age: 41
End: 2021-08-16
Payer: MEDICAID

## 2021-08-16 VITALS
HEART RATE: 81 BPM | OXYGEN SATURATION: 98 % | RESPIRATION RATE: 16 BRPM | BODY MASS INDEX: 39.87 KG/M2 | SYSTOLIC BLOOD PRESSURE: 118 MMHG | DIASTOLIC BLOOD PRESSURE: 85 MMHG | TEMPERATURE: 98.1 F | WEIGHT: 225 LBS | HEIGHT: 63 IN

## 2021-08-16 DIAGNOSIS — N23 RENAL COLIC: Primary | ICD-10-CM

## 2021-08-16 LAB
-: ABNORMAL
AMORPHOUS: ABNORMAL
BACTERIA: ABNORMAL
BILIRUBIN URINE: NEGATIVE
CASTS UA: ABNORMAL /LPF
CHP ED QC CHECK: NORMAL
COLOR: YELLOW
COMMENT UA: ABNORMAL
CRYSTALS, UA: ABNORMAL /HPF
EPITHELIAL CELLS UA: ABNORMAL /HPF (ref 0–5)
GLUCOSE URINE: NEGATIVE
KETONES, URINE: NEGATIVE
LEUKOCYTE ESTERASE, URINE: NEGATIVE
MUCUS: ABNORMAL
NITRITE, URINE: NEGATIVE
OTHER OBSERVATIONS UA: ABNORMAL
PH UA: 7 (ref 5–8)
PREGNANCY TEST URINE, POC: NEGATIVE
PROTEIN UA: NEGATIVE
RBC UA: ABNORMAL /HPF (ref 0–2)
RENAL EPITHELIAL, UA: ABNORMAL /HPF
SPECIFIC GRAVITY UA: 1.01 (ref 1–1.03)
TRICHOMONAS: ABNORMAL
TURBIDITY: CLEAR
URINE HGB: ABNORMAL
UROBILINOGEN, URINE: NORMAL
WBC UA: ABNORMAL /HPF (ref 0–5)
YEAST: ABNORMAL

## 2021-08-16 PROCEDURE — 81001 URINALYSIS AUTO W/SCOPE: CPT

## 2021-08-16 PROCEDURE — 81025 URINE PREGNANCY TEST: CPT

## 2021-08-16 PROCEDURE — 74176 CT ABD & PELVIS W/O CONTRAST: CPT

## 2021-08-16 PROCEDURE — 87086 URINE CULTURE/COLONY COUNT: CPT

## 2021-08-16 PROCEDURE — 99283 EMERGENCY DEPT VISIT LOW MDM: CPT

## 2021-08-16 RX ORDER — ACETAMINOPHEN AND CODEINE PHOSPHATE 300; 30 MG/1; MG/1
1 TABLET ORAL EVERY 6 HOURS PRN
Qty: 20 TABLET | Refills: 0 | Status: SHIPPED | OUTPATIENT
Start: 2021-08-16 | End: 2021-08-21

## 2021-08-16 ASSESSMENT — ENCOUNTER SYMPTOMS
ABDOMINAL PAIN: 0
DIARRHEA: 0
FACIAL SWELLING: 0
COUGH: 0
SHORTNESS OF BREATH: 0
CONSTIPATION: 0
COLOR CHANGE: 0
EYE REDNESS: 0
EYE DISCHARGE: 0
VOMITING: 0

## 2021-08-16 ASSESSMENT — PAIN DESCRIPTION - FREQUENCY: FREQUENCY: CONTINUOUS

## 2021-08-16 ASSESSMENT — PAIN DESCRIPTION - LOCATION: LOCATION: BACK;ABDOMEN

## 2021-08-16 ASSESSMENT — PAIN SCALES - GENERAL: PAINLEVEL_OUTOF10: 6

## 2021-08-16 ASSESSMENT — PAIN DESCRIPTION - DESCRIPTORS: DESCRIPTORS: CONSTANT;CRAMPING

## 2021-08-17 NOTE — ED PROVIDER NOTES
[facility] ED  EMERGENCY DEPARTMENT ENCOUNTER      Pt Name: Marisol Titus  MRN: [id number]  [place] 1980  Date of evaluation: 8/16/2021  Provider: Bere Hughes MD    [address] [place] [address]       Chief Complaint   Patient presents with    Abdominal Pain     onset yesterday    Flank Pain     left    Hematuria         HISTORY OF PRESENT ILLNESS  (Location/Symptom, Timing/Onset, Context/Setting, Quality, Duration, Modifying Factors, Severity.)   Marisol Titus is a 47 y.o. female who presents to the emergency department for left flank pain and hematuria. She had hematuria yesterday, one time. She has had pain in the left flank area wrapping around towards the front as well. Its intermittent. No vomiting or injury. Symptoms started yesterday. She has a history of kidney stones. Nursing Notes were reviewed. ALLERGIES     Bee venom    CURRENT MEDICATIONS       Previous Medications    CYCLOBENZAPRINE (FLEXERIL) 5 MG TABLET    Take 1 tablet by mouth nightly as needed for Muscle spasms    IBUPROFEN (ADVIL;MOTRIN) 800 MG TABLET    Take 1 tablet by mouth every 6 hours as needed for Pain    METFORMIN (GLUCOPHAGE) 500 MG TABLET    TAKE ONE TABLET BY MOUTH TWICE A DAY WITH MEALS    MULTIPLE VITAMIN (MULTI VITAMIN DAILY PO)    Take by mouth    OMEPRAZOLE (PRILOSEC) 40 MG DELAYED RELEASE CAPSULE    Take 1 capsule by mouth daily    PHENTERMINE (ADIPEX-P) 37.5 MG TABLET    Take 1 tablet by mouth every morning (before breakfast) for 30 days.        PAST MEDICAL HISTORY         Diagnosis Date    Anxiety     Dizziness     Gall bladder pain     Headache(784.0)     HSV-1 (herpes simplex virus 1) infection     Hyperthyroidism        SURGICAL HISTORY           Procedure Laterality Date    ENDOMETRIAL BIOPSY  08/09/2016    negative atypia or malignancy    INTRAUTERINE DEVICE INSERTION  01/13/2017    Mirena         FAMILY HISTORY           Problem Relation Age of Onset    Lupus Father     Stroke Father    Lily Roach Alcohol Abuse Father     Alcohol Abuse Mother     Thyroid Disease Mother      Family Status   Relation Name Status    Father      Mother  Alive        SOCIAL HISTORY      reports that she has quit smoking. She has never used smokeless tobacco. She reports current alcohol use. She reports that she does not use drugs. REVIEW OF SYSTEMS    (2-9 systems for level 4, 10 or more for level 5)     Review of Systems   Constitutional: Negative for chills, fatigue and fever. HENT: Negative for congestion, ear discharge and facial swelling. Eyes: Negative for discharge and redness. Respiratory: Negative for cough and shortness of breath. Cardiovascular: Negative for chest pain. Gastrointestinal: Negative for abdominal pain, constipation, diarrhea and vomiting. Genitourinary: Positive for flank pain and hematuria. Negative for dysuria. Musculoskeletal: Negative for arthralgias. Skin: Negative for color change and rash. Neurological: Negative for syncope, numbness and headaches. Hematological: Negative for adenopathy. Psychiatric/Behavioral: Negative for confusion. The patient is not nervous/anxious. Except as noted above the remainder of the review of systems was reviewed and negative. PHYSICAL EXAM    (up to 7 for level 4, 8 or more for level 5)     Vitals:    21   BP: 118/85   Pulse: 81   Resp: 16   Temp: 98.1 °F (36.7 °C)   TempSrc: Oral   SpO2: 98%   Weight: 225 lb (102.1 kg)   Height: 5' 3\" (1.6 m)       Physical Exam  Vitals reviewed. Constitutional:       General: She is not in acute distress. Appearance: She is well-developed. She is not diaphoretic. HENT:      Head: Normocephalic and atraumatic. Eyes:      General: No scleral icterus. Right eye: No discharge. Left eye: No discharge. Cardiovascular:      Rate and Rhythm: Normal rate and regular rhythm. Pulmonary:      Effort: Pulmonary effort is normal. No respiratory distress. Breath sounds: Normal breath sounds. No stridor. No wheezing or rales. Abdominal:      General: There is no distension. Palpations: Abdomen is soft. Tenderness: There is no abdominal tenderness. Musculoskeletal:         General: Normal range of motion. Cervical back: Neck supple. Lymphadenopathy:      Cervical: No cervical adenopathy. Skin:     General: Skin is warm and dry. Findings: No erythema or rash. Neurological:      Mental Status: She is alert and oriented to person, place, and time. Psychiatric:         Behavior: Behavior normal.             DIAGNOSTIC RESULTS     EKG: All EKG's are interpreted by the Emergency Department Physician who either signs or Co-signs this chart in the absence of a cardiologist.    Not indicated    RADIOLOGY:   Non-plain film images such as CT, Ultrasound and MRI are read by the radiologist. Plain radiographic images are visualized and preliminarily interpreted by the emergency physician with the below findings:    Interpretation per the Radiologist below, if available at the time of this note:    CT ABDOMEN PELVIS WO CONTRAST Additional Contrast? None    Result Date: 8/16/2021  EXAMINATION: CT OF THE ABDOMEN AND PELVIS WITHOUT CONTRAST 8/16/2021 9:47 pm TECHNIQUE: CT of the abdomen and pelvis was performed without the administration of intravenous contrast. Multiplanar reformatted images are provided for review. Dose modulation, iterative reconstruction, and/or weight based adjustment of the mA/kV was utilized to reduce the radiation dose to as low as reasonably achievable.  COMPARISON: 08/22/2011 HISTORY: ORDERING SYSTEM PROVIDED HISTORY: Left flank pain, rule out stone TECHNOLOGIST PROVIDED HISTORY: Pregnancy test pending Left flank pain, rule out stone Decision Support Exception - unselect if not a suspected or confirmed emergency medical condition->Emergency Medical Condition (MA) Is the patient pregnant?->No Reason for Exam: C/o mid low back pain radiating to left side. Hematuria. H/o kidney stones. Acuity: Acute Type of Exam: Initial FINDINGS: Lower Chest: No parenchymal infiltrate or pleural effusion is identified. No pericardial effusion is identified. No distal esophageal thickening is identified. Organs: There is a focal area of fat infiltration along the falciform ligament. No hypodense mass is otherwise identified in the liver or spleen. The adrenal glands appear unremarkable. Gallbladder contraction. The pancreas appears unremarkable. Nonobstructive calculus noted in the lower pole the right kidney. No ureteral calculi are identified. No hydroureteronephrosis is identified. No periureteral stranding is seen to suggest a recently passed stone. GI/Bowel: No ileus or obstruction is identified. The appendix appears normal.  No acute diverticulitis is identified. Pelvis: An intrauterine device is identified. The uterus and adnexal regions appear grossly unremarkable. No free fluid is identified in the pelvis. No pelvic mass is identified. Peritoneum/Retroperitoneum: No retroperitoneal or mesenteric lymphadenopathy is identified. No abdominal aortic aneurysm is identified. Bones/Soft Tissues: No acute subcutaneous soft tissue abnormality is identified. No acute osseous abnormality is identified. No osseous destructive changes are seen. No acute renal calculi or hydroureteronephrosis seen in the left kidney. No CT findings of a recently passed stone. Nonobstructive single calculus in the lower pole the right kidney measuring approximately 4 mm. Intrauterine device. Other incidental findings as above.        LABS:  Labs Reviewed   URINALYSIS - Abnormal; Notable for the following components:       Result Value    Urine Hgb 3+ (*)     All other components within normal limits   MICROSCOPIC URINALYSIS - Abnormal; Notable for the following components:    Crystals, UA 0 TO 2 AMMONIUM PHOSPHATE (*)     Bacteria, UA MODERATE (*) Amorphous, UA 1+ (*)     All other components within normal limits   POCT URINE PREGNANCY - Normal   CULTURE, URINE       All other labs were within normal range or not returned as of this dictation. EMERGENCY DEPARTMENT COURSE and DIFFERENTIAL DIAGNOSIS/MDM:   Vitals:    Vitals:    08/16/21 2011   BP: 118/85   Pulse: 81   Resp: 16   Temp: 98.1 °F (36.7 °C)   TempSrc: Oral   SpO2: 98%   Weight: 225 lb (102.1 kg)   Height: 5' 3\" (1.6 m)       Orders Placed This Encounter   Medications    acetaminophen-codeine (TYLENOL/CODEINE #3) 300-30 MG per tablet     Sig: Take 1 tablet by mouth every 6 hours as needed for Pain for up to 5 days. Dispense:  20 tablet     Refill:  0       Medical Decision Making: Small amount of blood in the urine and culture sent. No evidence of ureteral stone on CAT scan. I suspect that she may have passed a small stone. She will follow-up with urology and was provided pain medication. Treatment diagnosis and follow-up were discussed thoroughly with the patient. CONSULTS:  None    PROCEDURES:  None    FINAL IMPRESSION      1. Renal colic          DISPOSITION/PLAN   DISPOSITION Decision To Discharge 08/16/2021 11:30:08 PM      PATIENT REFERRED TO:   Brenda Ko MD  [address] [place] [address]  [phone]      As needed    [facility] ED  [address] [facility]  [phone]    If symptoms worsen    Jefe Caceres MD  [place] [address]  [address]  [phone]            DISCHARGE MEDICATIONS:     New Prescriptions    ACETAMINOPHEN-CODEINE (TYLENOL/CODEINE #3) 300-30 MG PER TABLET    Take 1 tablet by mouth every 6 hours as needed for Pain for up to 5 days.          (Please note that portions of this note were completed with a voice recognition program.  Efforts were made to edit the dictations but occasionally words are mis-transcribed.)    Estela Hermosillo MD  Attending Emergency Physician           Megan Grigsby Adriana Eckert MD  08/16/21 [id number]

## 2021-08-18 LAB
CULTURE: NORMAL
Lab: NORMAL
SPECIMEN DESCRIPTION: NORMAL

## 2021-08-20 LAB — HCG, PREGNANCY URINE (POC): NEGATIVE

## 2021-08-24 ENCOUNTER — TELEPHONE (OUTPATIENT)
Dept: FAMILY MEDICINE CLINIC | Age: 41
End: 2021-08-24

## 2021-08-24 RX ORDER — CEPHALEXIN 500 MG/1
500 CAPSULE ORAL 4 TIMES DAILY
Qty: 40 CAPSULE | Refills: 0 | Status: SHIPPED
Start: 2021-08-24 | End: 2022-02-01 | Stop reason: CLARIF

## 2021-08-24 NOTE — TELEPHONE ENCOUNTER
Antibiotic called in. If needed please make an appointment with for the patient to be seen as a virtual visit. Thank you.

## 2021-08-24 NOTE — TELEPHONE ENCOUNTER
-----Pt had a hang nail on middle finger of her left   hand that got pulled off a week ago. It now is swollen down to end of   finger and hurting all the way down to palm. She can't bend it all the   way. She would like to know if there is someone she could talk with about   it, she knows she cannot get it seen because she is covid positive. Please   call patient to advise.

## 2021-08-30 ENCOUNTER — TELEMEDICINE (OUTPATIENT)
Dept: FAMILY MEDICINE CLINIC | Age: 41
End: 2021-08-30
Payer: MEDICAID

## 2021-08-30 DIAGNOSIS — E28.2 PCOS (POLYCYSTIC OVARIAN SYNDROME): ICD-10-CM

## 2021-08-30 DIAGNOSIS — K21.9 GASTROESOPHAGEAL REFLUX DISEASE, UNSPECIFIED WHETHER ESOPHAGITIS PRESENT: ICD-10-CM

## 2021-08-30 DIAGNOSIS — R25.2 MUSCLE CRAMPS: ICD-10-CM

## 2021-08-30 DIAGNOSIS — E66.01 MORBID OBESITY WITH BMI OF 40.0-44.9, ADULT (HCC): ICD-10-CM

## 2021-08-30 DIAGNOSIS — S13.9XXA CERVICAL SPRAIN, INITIAL ENCOUNTER: ICD-10-CM

## 2021-08-30 DIAGNOSIS — G44.209 MUSCLE TENSION HEADACHE: ICD-10-CM

## 2021-08-30 PROCEDURE — 99214 OFFICE O/P EST MOD 30 MIN: CPT | Performed by: FAMILY MEDICINE

## 2021-08-30 RX ORDER — IBUPROFEN 800 MG/1
800 TABLET ORAL EVERY 6 HOURS PRN
Qty: 120 TABLET | Refills: 1 | Status: SHIPPED | OUTPATIENT
Start: 2021-08-30 | End: 2022-08-10

## 2021-08-30 RX ORDER — CYCLOBENZAPRINE HCL 5 MG
5 TABLET ORAL NIGHTLY PRN
Qty: 30 TABLET | Refills: 0 | Status: SHIPPED | OUTPATIENT
Start: 2021-08-30

## 2021-08-30 RX ORDER — OMEPRAZOLE 40 MG/1
40 CAPSULE, DELAYED RELEASE ORAL DAILY
Qty: 30 CAPSULE | Refills: 3 | Status: SHIPPED | OUTPATIENT
Start: 2021-08-30 | End: 2022-05-16

## 2021-08-30 RX ORDER — PHENTERMINE HYDROCHLORIDE 37.5 MG/1
37.5 TABLET ORAL
Qty: 30 TABLET | Refills: 0 | Status: SHIPPED | OUTPATIENT
Start: 2021-08-30 | End: 2022-05-04 | Stop reason: SDUPTHER

## 2021-08-30 ASSESSMENT — PATIENT HEALTH QUESTIONNAIRE - PHQ9
2. FEELING DOWN, DEPRESSED OR HOPELESS: 0
SUM OF ALL RESPONSES TO PHQ9 QUESTIONS 1 & 2: 0
SUM OF ALL RESPONSES TO PHQ QUESTIONS 1-9: 0
SUM OF ALL RESPONSES TO PHQ QUESTIONS 1-9: 0
1. LITTLE INTEREST OR PLEASURE IN DOING THINGS: 0
SUM OF ALL RESPONSES TO PHQ QUESTIONS 1-9: 0

## 2021-08-30 NOTE — PROGRESS NOTES
General FM note    TeleHealth encounter -- Audio/Visual (During [facility]ERSZ-60 public health emergency)    Sebastián Riddle is a 47 y.o. female who presents today for follow up on her  medical conditions as noted below. Sebastián Riddle is c/o of   Chief Complaint   Patient presents with    1 Month Follow-Up     adipex       Patient Active Problem List:     Abnormal uterine bleeding (AUB)     Menorrhagia with irregular cycle     PCOS (polycystic ovarian syndrome)     Gastroesophageal reflux disease without esophagitis     Past Medical History:   Diagnosis Date    Anxiety     Dizziness     Gall bladder pain     Headache(784.0)     HSV-1 (herpes simplex virus 1) infection     Hyperthyroidism       Past Surgical History:   Procedure Laterality Date    ENDOMETRIAL BIOPSY  08/09/2016    negative atypia or malignancy    INTRAUTERINE DEVICE INSERTION  01/13/2017    Mirena     Family History   Problem Relation Age of Onset    Lupus Father     Stroke Father     Alcohol Abuse Father     Alcohol Abuse Mother     Thyroid Disease Mother      Current Outpatient Medications   Medication Sig Dispense Refill    ibuprofen (ADVIL;MOTRIN) 800 MG tablet Take 1 tablet by mouth every 6 hours as needed for Pain 120 tablet 1    metFORMIN (GLUCOPHAGE) 500 MG tablet TAKE ONE TABLET BY MOUTH TWICE A DAY WITH MEALS 60 tablet 2    cyclobenzaprine (FLEXERIL) 5 MG tablet Take 1 tablet by mouth nightly as needed for Muscle spasms 30 tablet 0    omeprazole (PRILOSEC) 40 MG delayed release capsule Take 1 capsule by mouth daily 30 capsule 3    phentermine (ADIPEX-P) 37.5 MG tablet Take 1 tablet by mouth every morning (before breakfast) for 30 days. 30 tablet 0    cephALEXin (KEFLEX) 500 MG capsule Take 1 capsule by mouth 4 times daily 40 capsule 0    Multiple Vitamin (MULTI VITAMIN DAILY PO) Take by mouth       No current facility-administered medications for this visit.      ALLERGIES:    Allergies   Allergen Reactions    Bee Venom MG tablet       Plan:   meds refilled. Last script of adipex provided. Continue diet and exercises. Patient will send me a picture of the left middle finger to see how but the swelling or even the redness is. If needed will start her on additional medication./Bactrim. Call or return to clinic prn if these symptoms worsen or fail to improve as anticipated. I have reviewed the instructions with the patient, answering all questions to her satisfaction. Bella Chacon is a 47 y.o. female being evaluated by a Virtual Visit (video visit) encounter to address concerns as mentioned above. A caregiver was present when appropriate. Due to this being a TeleHealth encounter (During \[facility]\""-88 public health emergency), evaluation of the following organ systems was limited: Vitals/Constitutional/EENT/Resp/CV/GI//MS/Neuro/Skin/Heme-Lymph-Imm. Pursuant to the emergency declaration under the [address] [place] [address] and the [employer] [facility] and [employer] Act, this Virtual Visit was conducted with patient's (and/or legal guardian's) consent, to reduce the patient's risk of exposure to COVID-19 and provide necessary medical care. The patient (and/or legal guardian) has also been advised to contact this office for worsening conditions or problems, and seek emergency medical treatment and/or call 911 if deemed necessary. Patient identification was verified at the start of the visit: Yes    Total time spent for this encounter: 22 minutes    Services were provided through a video synchronous discussion virtually to substitute for in-person clinic visit. Patient and provider were located at their individual homes. --Amanda Dunbar MD on 8/31/2021 at 6:25 AM    An electronic signature was used to authenticate this note. Return in about 7 months (around 3/30/2022), or if symptoms worsen or fail to improve.   No orders of the defined types were placed in this encounter. Orders Placed This Encounter   Medications    ibuprofen (ADVIL;MOTRIN) 800 MG tablet     Sig: Take 1 tablet by mouth every 6 hours as needed for Pain     Dispense:  120 tablet     Refill:  1    metFORMIN (GLUCOPHAGE) 500 MG tablet     Sig: TAKE ONE TABLET BY MOUTH TWICE A DAY WITH MEALS     Dispense:  60 tablet     Refill:  2     Patient needs an appt.  cyclobenzaprine (FLEXERIL) 5 MG tablet     Sig: Take 1 tablet by mouth nightly as needed for Muscle spasms     Dispense:  30 tablet     Refill:  0    omeprazole (PRILOSEC) 40 MG delayed release capsule     Sig: Take 1 capsule by mouth daily     Dispense:  30 capsule     Refill:  3    phentermine (ADIPEX-P) 37.5 MG tablet     Sig: Take 1 tablet by mouth every morning (before breakfast) for 30 days.      Dispense:  30 tablet     Refill:  0       (Please note that portions of this note were completed with a voice recognition program. Efforts were made to edit the dictations but occasionally words are mis-transcribed.)

## 2021-09-01 ENCOUNTER — PATIENT MESSAGE (OUTPATIENT)
Dept: FAMILY MEDICINE CLINIC | Age: 41
End: 2021-09-01

## 2021-09-01 DIAGNOSIS — M79.89 SWELLING OF MIDDLE FINGER: Primary | ICD-10-CM

## 2021-09-04 ENCOUNTER — HOSPITAL ENCOUNTER (EMERGENCY)
Age: 41
Discharge: HOME | End: 2021-09-04
Attending: EMERGENCY MEDICINE
Payer: MEDICAID

## 2021-09-04 ENCOUNTER — APPOINTMENT (OUTPATIENT)
Dept: CT IMAGING | Age: 41
End: 2021-09-04
Payer: MEDICAID

## 2021-09-04 VITALS
HEART RATE: 77 BPM | SYSTOLIC BLOOD PRESSURE: 132 MMHG | HEIGHT: 63 IN | DIASTOLIC BLOOD PRESSURE: 96 MMHG | OXYGEN SATURATION: 100 % | WEIGHT: 210 LBS | BODY MASS INDEX: 37.21 KG/M2 | RESPIRATION RATE: 22 BRPM | TEMPERATURE: 97.7 F

## 2021-09-04 DIAGNOSIS — N20.0 KIDNEY STONE: Primary | ICD-10-CM

## 2021-09-04 LAB
-: ABNORMAL
ABSOLUTE EOS #: 0.15 K/UL (ref 0–0.44)
ABSOLUTE IMMATURE GRANULOCYTE: 0.03 K/UL (ref 0–0.3)
ABSOLUTE LYMPH #: 2.09 K/UL (ref 1.1–3.7)
ABSOLUTE MONO #: 0.67 K/UL (ref 0.1–1.2)
ALBUMIN SERPL-MCNC: 4.1 G/DL (ref 3.5–5.2)
ALBUMIN/GLOBULIN RATIO: ABNORMAL (ref 1–2.5)
ALP BLD-CCNC: 55 U/L (ref 35–104)
ALT SERPL-CCNC: 25 U/L (ref 5–33)
AMORPHOUS: ABNORMAL
ANION GAP SERPL CALCULATED.3IONS-SCNC: 11 MMOL/L (ref 9–17)
AST SERPL-CCNC: 25 U/L
BACTERIA: ABNORMAL
BASOPHILS # BLD: 1 % (ref 0–2)
BASOPHILS ABSOLUTE: 0.04 K/UL (ref 0–0.2)
BILIRUB SERPL-MCNC: 0.26 MG/DL (ref 0.3–1.2)
BILIRUBIN DIRECT: 0.09 MG/DL
BILIRUBIN URINE: NEGATIVE
BILIRUBIN, INDIRECT: 0.17 MG/DL (ref 0–1)
BUN BLDV-MCNC: 10 MG/DL (ref 6–20)
BUN/CREAT BLD: 10 (ref 9–20)
CALCIUM SERPL-MCNC: 9 MG/DL (ref 8.6–10.4)
CASTS UA: ABNORMAL /LPF
CHLORIDE BLD-SCNC: 108 MMOL/L (ref 98–107)
CO2: 22 MMOL/L (ref 20–31)
COLOR: YELLOW
COMMENT UA: ABNORMAL
CREAT SERPL-MCNC: 1.05 MG/DL (ref 0.5–0.9)
CRYSTALS, UA: ABNORMAL /HPF
DIFFERENTIAL TYPE: ABNORMAL
EOSINOPHILS RELATIVE PERCENT: 2 % (ref 1–4)
EPITHELIAL CELLS UA: ABNORMAL /HPF (ref 0–5)
GFR AFRICAN AMERICAN: >60 ML/MIN
GFR NON-AFRICAN AMERICAN: 59 ML/MIN
GFR SERPL CREATININE-BSD FRML MDRD: ABNORMAL ML/MIN/{1.73_M2}
GFR SERPL CREATININE-BSD FRML MDRD: ABNORMAL ML/MIN/{1.73_M2}
GLOBULIN: ABNORMAL G/DL (ref 1.5–3.8)
GLUCOSE BLD-MCNC: 127 MG/DL (ref 70–99)
GLUCOSE URINE: NEGATIVE
HCG QUALITATIVE: NEGATIVE
HCT VFR BLD CALC: 42.7 % (ref 36.3–47.1)
HEMOGLOBIN: 14.2 G/DL (ref 11.9–15.1)
IMMATURE GRANULOCYTES: 1 %
KETONES, URINE: ABNORMAL
LEUKOCYTE ESTERASE, URINE: NEGATIVE
LIPASE: 28 U/L (ref 13–60)
LYMPHOCYTES # BLD: 34 % (ref 24–43)
MCH RBC QN AUTO: 29.8 PG (ref 25.2–33.5)
MCHC RBC AUTO-ENTMCNC: 33.3 G/DL (ref 28.4–34.8)
MCV RBC AUTO: 89.5 FL (ref 82.6–102.9)
MONOCYTES # BLD: 11 % (ref 3–12)
MUCUS: ABNORMAL
NITRITE, URINE: NEGATIVE
NRBC AUTOMATED: 0 PER 100 WBC
OTHER OBSERVATIONS UA: ABNORMAL
PDW BLD-RTO: 12 % (ref 11.8–14.4)
PH UA: 6 (ref 5–8)
PLATELET # BLD: 500 K/UL (ref 138–453)
PLATELET ESTIMATE: ABNORMAL
PMV BLD AUTO: 8.4 FL (ref 8.1–13.5)
POTASSIUM SERPL-SCNC: 3.9 MMOL/L (ref 3.7–5.3)
PROTEIN UA: NEGATIVE
RBC # BLD: 4.77 M/UL (ref 3.95–5.11)
RBC # BLD: ABNORMAL 10*6/UL
RBC UA: ABNORMAL /HPF (ref 0–2)
RENAL EPITHELIAL, UA: ABNORMAL /HPF
SEG NEUTROPHILS: 51 % (ref 36–65)
SEGMENTED NEUTROPHILS ABSOLUTE COUNT: 3.15 K/UL (ref 1.5–8.1)
SODIUM BLD-SCNC: 141 MMOL/L (ref 135–144)
SPECIFIC GRAVITY UA: 1.03 (ref 1–1.03)
TOTAL PROTEIN: 7.5 G/DL (ref 6.4–8.3)
TRICHOMONAS: ABNORMAL
TURBIDITY: ABNORMAL
URINE HGB: ABNORMAL
UROBILINOGEN, URINE: NORMAL
WBC # BLD: 6.1 K/UL (ref 3.5–11.3)
WBC # BLD: ABNORMAL 10*3/UL
WBC UA: ABNORMAL /HPF (ref 0–5)
YEAST: ABNORMAL

## 2021-09-04 PROCEDURE — 96374 THER/PROPH/DIAG INJ IV PUSH: CPT

## 2021-09-04 PROCEDURE — 84703 CHORIONIC GONADOTROPIN ASSAY: CPT

## 2021-09-04 PROCEDURE — 99284 EMERGENCY DEPT VISIT MOD MDM: CPT

## 2021-09-04 PROCEDURE — 96376 TX/PRO/DX INJ SAME DRUG ADON: CPT

## 2021-09-04 PROCEDURE — 6360000002 HC RX W HCPCS: Performed by: EMERGENCY MEDICINE

## 2021-09-04 PROCEDURE — 96375 TX/PRO/DX INJ NEW DRUG ADDON: CPT

## 2021-09-04 PROCEDURE — 85025 COMPLETE CBC W/AUTO DIFF WBC: CPT

## 2021-09-04 PROCEDURE — 6370000000 HC RX 637 (ALT 250 FOR IP): Performed by: EMERGENCY MEDICINE

## 2021-09-04 PROCEDURE — 80076 HEPATIC FUNCTION PANEL: CPT

## 2021-09-04 PROCEDURE — 74176 CT ABD & PELVIS W/O CONTRAST: CPT

## 2021-09-04 PROCEDURE — 2580000003 HC RX 258: Performed by: EMERGENCY MEDICINE

## 2021-09-04 PROCEDURE — 80048 BASIC METABOLIC PNL TOTAL CA: CPT

## 2021-09-04 PROCEDURE — 81001 URINALYSIS AUTO W/SCOPE: CPT

## 2021-09-04 PROCEDURE — 83690 ASSAY OF LIPASE: CPT

## 2021-09-04 RX ORDER — HYDROMORPHONE HYDROCHLORIDE 1 MG/ML
1 INJECTION, SOLUTION INTRAMUSCULAR; INTRAVENOUS; SUBCUTANEOUS ONCE
Status: COMPLETED | OUTPATIENT
Start: 2021-09-04 | End: 2021-09-04

## 2021-09-04 RX ORDER — TAMSULOSIN HYDROCHLORIDE 0.4 MG/1
0.4 CAPSULE ORAL DAILY
Status: DISCONTINUED | OUTPATIENT
Start: 2021-09-04 | End: 2021-09-04 | Stop reason: HOSPADM

## 2021-09-04 RX ORDER — TAMSULOSIN HYDROCHLORIDE 0.4 MG/1
0.4 CAPSULE ORAL DAILY
Qty: 5 CAPSULE | Refills: 0 | Status: SHIPPED | OUTPATIENT
Start: 2021-09-04 | End: 2021-09-08 | Stop reason: SDUPTHER

## 2021-09-04 RX ORDER — ONDANSETRON 4 MG/1
4 TABLET, ORALLY DISINTEGRATING ORAL EVERY 8 HOURS PRN
Qty: 20 TABLET | Refills: 0 | Status: SHIPPED | OUTPATIENT
Start: 2021-09-04 | End: 2022-02-01 | Stop reason: CLARIF

## 2021-09-04 RX ORDER — HYDROCODONE BITARTRATE AND ACETAMINOPHEN 5; 325 MG/1; MG/1
1 TABLET ORAL EVERY 6 HOURS PRN
Qty: 10 TABLET | Refills: 0 | Status: SHIPPED | OUTPATIENT
Start: 2021-09-04 | End: 2021-09-07

## 2021-09-04 RX ORDER — 0.9 % SODIUM CHLORIDE 0.9 %
1000 INTRAVENOUS SOLUTION INTRAVENOUS ONCE
Status: COMPLETED | OUTPATIENT
Start: 2021-09-04 | End: 2021-09-04

## 2021-09-04 RX ORDER — ONDANSETRON 2 MG/ML
4 INJECTION INTRAMUSCULAR; INTRAVENOUS ONCE
Status: COMPLETED | OUTPATIENT
Start: 2021-09-04 | End: 2021-09-04

## 2021-09-04 RX ORDER — KETOROLAC TROMETHAMINE 30 MG/ML
30 INJECTION, SOLUTION INTRAMUSCULAR; INTRAVENOUS ONCE
Status: COMPLETED | OUTPATIENT
Start: 2021-09-04 | End: 2021-09-04

## 2021-09-04 RX ADMIN — ONDANSETRON 4 MG: 2 INJECTION INTRAMUSCULAR; INTRAVENOUS at 10:05

## 2021-09-04 RX ADMIN — HYDROMORPHONE HYDROCHLORIDE 1 MG: 1 INJECTION, SOLUTION INTRAMUSCULAR; INTRAVENOUS; SUBCUTANEOUS at 10:23

## 2021-09-04 RX ADMIN — TAMSULOSIN HYDROCHLORIDE 0.4 MG: 0.4 CAPSULE ORAL at 13:48

## 2021-09-04 RX ADMIN — SODIUM CHLORIDE 1000 ML: 9 INJECTION, SOLUTION INTRAVENOUS at 10:05

## 2021-09-04 RX ADMIN — HYDROMORPHONE HYDROCHLORIDE 1 MG: 1 INJECTION, SOLUTION INTRAMUSCULAR; INTRAVENOUS; SUBCUTANEOUS at 12:08

## 2021-09-04 RX ADMIN — KETOROLAC TROMETHAMINE 30 MG: 30 INJECTION, SOLUTION INTRAMUSCULAR at 10:06

## 2021-09-04 ASSESSMENT — PAIN SCALES - GENERAL
PAINLEVEL_OUTOF10: 7
PAINLEVEL_OUTOF10: 10

## 2021-09-04 ASSESSMENT — ENCOUNTER SYMPTOMS
COUGH: 0
VOMITING: 0
SHORTNESS OF BREATH: 0
ABDOMINAL PAIN: 1
NAUSEA: 0
RHINORRHEA: 0
COLOR CHANGE: 0
EYE DISCHARGE: 0
EYE REDNESS: 0
DIARRHEA: 0
SORE THROAT: 0

## 2021-09-04 NOTE — ED PROVIDER NOTES
EMERGENCY DEPARTMENT ENCOUNTER    Pt Name: Mireya Chinchilla  MRN: [id number]  [place] 1980  Date of evaluation: 9/4/21  CHIEF COMPLAINT       Chief Complaint   Patient presents with    Flank Pain    Positive For Covid-19     HISTORY OF PRESENT ILLNESS   45-year-old female presents with complaints of severe pain and discomfort in the right lower abdomen and right low back. The patient symptoms began today at around 4:00 this morning. Patient states that she has severe pain and discomfort, she was seen here recently for renal colic with no ureteral stone identified. Patient denies fevers or chills no cough or sputum production, she denies nausea or vomiting. REVIEW OF SYSTEMS     Review of Systems   Constitutional: Negative for chills and fever. HENT: Negative for rhinorrhea and sore throat. Eyes: Negative for discharge, redness and visual disturbance. Respiratory: Negative for cough and shortness of breath. Cardiovascular: Negative for chest pain, palpitations and leg swelling. Gastrointestinal: Positive for abdominal pain. Negative for diarrhea, nausea and vomiting. Genitourinary: Negative for dysuria and hematuria. Musculoskeletal: Negative for arthralgias, myalgias and neck pain. Skin: Negative for color change and rash. Neurological: Negative for seizures, weakness and headaches. Psychiatric/Behavioral: Negative for hallucinations, self-injury and suicidal ideas.      PASTMEDICAL HISTORY     Past Medical History:   Diagnosis Date    Anxiety     Dizziness     Gall bladder pain     Headache(784.0)     HSV-1 (herpes simplex virus 1) infection     Hyperthyroidism      Past Problem List  Patient Active Problem List   Diagnosis Code    Abnormal uterine bleeding (AUB) N93.9    Menorrhagia with irregular cycle N92.1    PCOS (polycystic ovarian syndrome) E28.2    Gastroesophageal reflux disease without esophagitis K21.9     SURGICAL HISTORY       Past Surgical History: Procedure Laterality Date    ENDOMETRIAL BIOPSY  2016    negative atypia or malignancy    INTRAUTERINE DEVICE INSERTION  2017    Mirena     CURRENT MEDICATIONS       Previous Medications    CEPHALEXIN (KEFLEX) 500 MG CAPSULE    Take 1 capsule by mouth 4 times daily    CYCLOBENZAPRINE (FLEXERIL) 5 MG TABLET    Take 1 tablet by mouth nightly as needed for Muscle spasms    IBUPROFEN (ADVIL;MOTRIN) 800 MG TABLET    Take 1 tablet by mouth every 6 hours as needed for Pain    METFORMIN (GLUCOPHAGE) 500 MG TABLET    TAKE ONE TABLET BY MOUTH TWICE A DAY WITH MEALS    MULTIPLE VITAMIN (MULTI VITAMIN DAILY PO)    Take by mouth    OMEPRAZOLE (PRILOSEC) 40 MG DELAYED RELEASE CAPSULE    Take 1 capsule by mouth daily    PHENTERMINE (ADIPEX-P) 37.5 MG TABLET    Take 1 tablet by mouth every morning (before breakfast) for 30 days. ALLERGIES     is allergic to bee venom. FAMILY HISTORY     She indicated that her mother is alive. She indicated that her father is . SOCIAL HISTORY       Social History     Tobacco Use    Smoking status: Former Smoker    Smokeless tobacco: Never Used   Substance Use Topics    Alcohol use: Yes     Alcohol/week: 0.0 standard drinks     Comment: socially    Drug use: No     PHYSICAL EXAM     INITIAL VITALS: BP (!) 132/96   Pulse 77   Temp 97.7 °F (36.5 °C) (Oral)   Resp 22   Ht 5' 3\" (1.6 m)   Wt 210 lb (95.3 kg)   SpO2 100%   BMI 37.20 kg/m²    Physical Exam  Constitutional:       Appearance: Normal appearance. She is well-developed. She is not ill-appearing or toxic-appearing. HENT:      Head: Normocephalic and atraumatic. Eyes:      Conjunctiva/sclera: Conjunctivae normal.      Pupils: Pupils are equal, round, and reactive to light. Neck:      Trachea: Trachea normal.   Cardiovascular:      Rate and Rhythm: Normal rate and regular rhythm. Heart sounds: S1 normal and S2 normal. No murmur heard.      Pulmonary:      Effort: Pulmonary effort is normal. No accessory muscle usage or respiratory distress. Breath sounds: Normal breath sounds. Chest:      Chest wall: No deformity or tenderness. Abdominal:      General: Bowel sounds are normal. There is no distension or abdominal bruit. Palpations: Abdomen is not rigid. Tenderness: There is abdominal tenderness in the right lower quadrant. There is no right CVA tenderness, left CVA tenderness, guarding or rebound. Negative signs include Jenkins's sign and McBurney's sign. Musculoskeletal:      Cervical back: Normal range of motion and neck supple. Skin:     General: Skin is warm. Findings: No rash. Neurological:      Mental Status: She is alert and oriented to person, place, and time. GCS: GCS eye subscore is 4. GCS verbal subscore is 5. GCS motor subscore is 6. Psychiatric:         Speech: Speech normal.         MEDICAL DECISION MAKIN-year-old female presents with complaints of pain and discomfort in the right lower quadrant, plan is basic labs CT scan and reevaluation. 1:38 PM EDT  CT scan shows evidence of a kidney stone, no evidence of infection, plan is discharged with pain control nausea medications Flomax and outpatient follow-up. CRITICAL CARE:       PROCEDURES:    Procedures    DIAGNOSTIC RESULTS   EKG:All EKG's are interpreted by the Emergency Department Physician who either signs or Co-signs this chart in the absence of a cardiologist.        RADIOLOGY:All plain film, CT, MRI, and formal ultrasound images (except ED bedside ultrasound) are read by the radiologist, see reports below, unless otherwisenoted in MDM or here. CT ABDOMEN PELVIS WO CONTRAST Additional Contrast? None   Final Result   4 mm calculus at the right UPJ causing moderate right hydronephrosis. No   other acute abdominal abnormality. Appearance of the lung bases compatible with slight to mild acute bilateral   COVID-19 pneumonia.            LABS: All lab results were reviewed by myself, and all abnormals are listed below. Labs Reviewed   BASIC METABOLIC PANEL - Abnormal; Notable for the following components:       Result Value    Glucose 127 (*)     CREATININE 1.05 (*)     Chloride 108 (*)     GFR Non- 59 (*)     All other components within normal limits   CBC WITH AUTO DIFFERENTIAL - Abnormal; Notable for the following components:    Platelets 773 (*)     Immature Granulocytes 1 (*)     All other components within normal limits   HEPATIC FUNCTION PANEL - Abnormal; Notable for the following components: Total Bilirubin 0.26 (*)     All other components within normal limits   URINALYSIS - Abnormal; Notable for the following components:    Turbidity UA SLIGHTLY CLOUDY (*)     Ketones, Urine 2+ (*)     Specific Gravity, UA 1.032 (*)     Urine Hgb 2+ (*)     All other components within normal limits   MICROSCOPIC URINALYSIS - Abnormal; Notable for the following components:    Mucus, UA 1+ (*)     All other components within normal limits   LIPASE   HCG, SERUM, QUALITATIVE       EMERGENCY DEPARTMENTCOURSE:         Vitals:    Vitals:    09/04/21 1007   BP: (!) 132/96   Pulse: 77   Resp: 22   Temp: 97.7 °F (36.5 °C)   TempSrc: Oral   SpO2: 100%   Weight: 210 lb (95.3 kg)   Height: 5' 3\" (1.6 m)       The patient was given the following medications while in the emergency department:  Orders Placed This Encounter   Medications    0.9 % sodium chloride bolus    ondansetron (ZOFRAN) injection 4 mg    ketorolac (TORADOL) injection 30 mg    HYDROmorphone HCl PF (DILAUDID) injection 1 mg    HYDROmorphone HCl PF (DILAUDID) injection 1 mg    HYDROcodone-acetaminophen (NORCO) 5-325 MG per tablet     Sig: Take 1 tablet by mouth every 6 hours as needed for Pain for up to 3 days.      Dispense:  10 tablet     Refill:  0    tamsulosin (FLOMAX) capsule 0.4 mg    tamsulosin (FLOMAX) 0.4 MG capsule     Sig: Take 1 capsule by mouth daily for 5 doses     Dispense:  5 capsule     Refill:  0    ondansetron (ZOFRAN ODT) 4 MG disintegrating tablet     Sig: Take 1 tablet by mouth every 8 hours as needed for Nausea     Dispense:  20 tablet     Refill:  0     CONSULTS:  None    FINAL IMPRESSION      1. Kidney stone          DISPOSITION/PLAN   DISPOSITION Decision To Discharge 09/04/2021 01:32:39 PM      PATIENT REFERRED TO:  Jan Sommer MD  [facility] [address] [place] [address] 3  [facility]  [phone]    Schedule an appointment as soon as possible for a visit in 2 days      DISCHARGE MEDICATIONS:  New Prescriptions    HYDROCODONE-ACETAMINOPHEN (NORCO) 5-325 MG PER TABLET    Take 1 tablet by mouth every 6 hours as needed for Pain for up to 3 days.     ONDANSETRON (ZOFRAN ODT) 4 MG DISINTEGRATING TABLET    Take 1 tablet by mouth every 8 hours as needed for Nausea    TAMSULOSIN (FLOMAX) 0.4 MG CAPSULE    Take 1 capsule by mouth daily for 5 doses     Albin Csatorena MD  Attending Emergency Physician                   Albin Castorena MD  09/04/21 [id number]

## 2021-09-07 ENCOUNTER — TELEPHONE (OUTPATIENT)
Dept: FAMILY MEDICINE CLINIC | Age: 41
End: 2021-09-07

## 2021-09-07 NOTE — TELEPHONE ENCOUNTER
---Patient was seen at [facility] on   9/4/21 for kidney stones and needs an appt as soon as possible. She still   has not passed the kidney stone(4 mm) and it is extremely painful and is   out of the medication that was given to her at the ER.

## 2021-09-08 ENCOUNTER — TELEPHONE (OUTPATIENT)
Dept: FAMILY MEDICINE CLINIC | Age: 41
End: 2021-09-08

## 2021-09-08 DIAGNOSIS — N20.0 RENAL STONE: Primary | ICD-10-CM

## 2021-09-08 RX ORDER — HYDROCODONE BITARTRATE AND ACETAMINOPHEN 5; 325 MG/1; MG/1
1 TABLET ORAL 2 TIMES DAILY
Qty: 10 TABLET | Refills: 0 | Status: SHIPPED | OUTPATIENT
Start: 2021-09-08 | End: 2021-09-13

## 2021-09-08 RX ORDER — TAMSULOSIN HYDROCHLORIDE 0.4 MG/1
0.4 CAPSULE ORAL DAILY
Qty: 5 CAPSULE | Refills: 0 | Status: SHIPPED | OUTPATIENT
Start: 2021-09-08 | End: 2021-09-10 | Stop reason: SDUPTHER

## 2021-09-08 NOTE — TELEPHONE ENCOUNTER
----- Message from Radha Claire sent at 9/7/2021  4:32 PM EDT -----  Subject: Message to Provider    QUESTIONS  Information for Provider? pt called in again due to kidney stones and is   asking that the  prescribe her something for the pain while she is   waiting to get an appointment she was seen in the ER on 9/4/2021 it is a   4mm stone please advise if something can be called in  ---------------------------------------------------------------------------  --------------  [address]  What is the best way for the office to contact you? OK to leave message on   voicemail  Preferred Call Back Phone Number? [phone]  ---------------------------------------------------------------------------  --------------  SCRIPT ANSWERS  Relationship to Patient?  Self

## 2021-09-08 NOTE — TELEPHONE ENCOUNTER
I did call in the pain medication. Please make sure the patient is on the Flomax or similar medication. Thank you.

## 2021-09-10 RX ORDER — TAMSULOSIN HYDROCHLORIDE 0.4 MG/1
0.4 CAPSULE ORAL DAILY
Qty: 10 CAPSULE | Refills: 0 | Status: SHIPPED
Start: 2021-09-10 | End: 2022-02-01 | Stop reason: CLARIF

## 2021-09-23 ENCOUNTER — OFFICE VISIT (OUTPATIENT)
Dept: FAMILY MEDICINE CLINIC | Age: 41
End: 2021-09-23
Payer: MEDICAID

## 2021-09-23 VITALS
BODY MASS INDEX: 39.5 KG/M2 | DIASTOLIC BLOOD PRESSURE: 80 MMHG | SYSTOLIC BLOOD PRESSURE: 118 MMHG | WEIGHT: 223 LBS | HEART RATE: 99 BPM | TEMPERATURE: 97.7 F | OXYGEN SATURATION: 98 %

## 2021-09-23 DIAGNOSIS — N20.0 KIDNEY STONE ON RIGHT SIDE: Primary | ICD-10-CM

## 2021-09-23 DIAGNOSIS — K21.9 GASTROESOPHAGEAL REFLUX DISEASE WITHOUT ESOPHAGITIS: ICD-10-CM

## 2021-09-23 PROCEDURE — G8427 DOCREV CUR MEDS BY ELIG CLIN: HCPCS | Performed by: FAMILY MEDICINE

## 2021-09-23 PROCEDURE — 1036F TOBACCO NON-USER: CPT | Performed by: FAMILY MEDICINE

## 2021-09-23 PROCEDURE — G8417 CALC BMI ABV UP PARAM F/U: HCPCS | Performed by: FAMILY MEDICINE

## 2021-09-23 PROCEDURE — 99213 OFFICE O/P EST LOW 20 MIN: CPT | Performed by: FAMILY MEDICINE

## 2021-09-23 RX ORDER — FAMOTIDINE 20 MG/1
20 TABLET, FILM COATED ORAL 2 TIMES DAILY
Qty: 60 TABLET | Refills: 5 | Status: SHIPPED
Start: 2021-09-23 | End: 2022-02-01 | Stop reason: CLARIF

## 2021-09-23 ASSESSMENT — PATIENT HEALTH QUESTIONNAIRE - PHQ9
1. LITTLE INTEREST OR PLEASURE IN DOING THINGS: 0
SUM OF ALL RESPONSES TO PHQ QUESTIONS 1-9: 0
2. FEELING DOWN, DEPRESSED OR HOPELESS: 0
SUM OF ALL RESPONSES TO PHQ9 QUESTIONS 1 & 2: 0

## 2021-09-23 NOTE — PROGRESS NOTES
General FM note    Amie Bazzi is a 46 y.o. female who presents today for follow up on her  medical conditions as noted below. Amie Bazzi is c/o of   Chief Complaint   Patient presents with    Other     9/4/21 f/u ER [facility]       Patient Active Problem List:     Abnormal uterine bleeding (AUB)     Menorrhagia with irregular cycle     PCOS (polycystic ovarian syndrome)     Gastroesophageal reflux disease without esophagitis     Past Medical History:   Diagnosis Date    Anxiety     Dizziness     Gall bladder pain     Headache(784.0)     HSV-1 (herpes simplex virus 1) infection     Hyperthyroidism       Past Surgical History:   Procedure Laterality Date    ENDOMETRIAL BIOPSY  08/09/2016    negative atypia or malignancy    INTRAUTERINE DEVICE INSERTION  01/13/2017    Mirena     Family History   Problem Relation Age of Onset    Lupus Father     Stroke Father     Alcohol Abuse Father     Alcohol Abuse Mother     Thyroid Disease Mother      Current Outpatient Medications   Medication Sig Dispense Refill    famotidine (PEPCID) 20 MG tablet Take 1 tablet by mouth 2 times daily 60 tablet 5    ondansetron (ZOFRAN ODT) 4 MG disintegrating tablet Take 1 tablet by mouth every 8 hours as needed for Nausea 20 tablet 0    ibuprofen (ADVIL;MOTRIN) 800 MG tablet Take 1 tablet by mouth every 6 hours as needed for Pain 120 tablet 1    metFORMIN (GLUCOPHAGE) 500 MG tablet TAKE ONE TABLET BY MOUTH TWICE A DAY WITH MEALS 60 tablet 2    cyclobenzaprine (FLEXERIL) 5 MG tablet Take 1 tablet by mouth nightly as needed for Muscle spasms 30 tablet 0    omeprazole (PRILOSEC) 40 MG delayed release capsule Take 1 capsule by mouth daily 30 capsule 3    phentermine (ADIPEX-P) 37.5 MG tablet Take 1 tablet by mouth every morning (before breakfast) for 30 days.  30 tablet 0    cephALEXin (KEFLEX) 500 MG capsule Take 1 capsule by mouth 4 times daily 40 capsule 0    Multiple Vitamin (MULTI VITAMIN DAILY PO) Take by mouth      tamsulosin (FLOMAX) 0.4 MG capsule Take 1 capsule by mouth daily for 5 doses 10 capsule 0     No current facility-administered medications for this visit. ALLERGIES:    Allergies   Allergen Reactions    Bee Venom        Social History     Tobacco Use    Smoking status: Former Smoker    Smokeless tobacco: Never Used   Substance Use Topics    Alcohol use: Yes     Alcohol/week: 0.0 standard drinks     Comment: socially      Body mass index is 39.5 kg/m². /80   Pulse 99   Temp 97.7 °F (36.5 °C)   Wt 223 lb (101.2 kg)   SpO2 98%   BMI 39.50 kg/m²     Subjective:      HPI    46 y.o. female coming today for follow-up after she was seen in the ER on 09/04/2021 because of kidney stone 4 mm at the right UPJ J/moderate right hydronephrosis seen. She was seen also on 08/16/2021 due to renal stone. She did have some pain around 1 week ago. Which is resolved. Feels that she takes to much tums and calcium due to her GERD, takes omeprazole. Has changed her diet. Review of Systems   Constitutional: Negative for fever and unexpected weight change. Pertinent items are noted in HPI. Objective:   Physical Exam  Constitutional: VS (see above). General appearance: normal development, habitus and attention, no deformities. No distress. Eyes: normal conjunctiva and lids. CAV: RRR, no RMG. No edema lower extremities. Pulmo: CTA bilateral, no CWR. Skin: no rashes, lesions or ulcers. Musculoskeletal: normal gait. Nails: no clubbing or cyanosis. Psychiatric: alert and oriented to place, time and person. Normal mood and affect. Assessment:       Diagnosis Orders   1. Kidney stone on right side     2. Gastroesophageal reflux disease without esophagitis         Plan:   She will call if the famotidine does not work. Return in about 6 months (around 3/23/2022), or if symptoms worsen or fail to improve. No orders of the defined types were placed in this encounter.     Orders Placed This Encounter   Medications    famotidine (PEPCID) 20 MG tablet     Sig: Take 1 tablet by mouth 2 times daily     Dispense:  60 tablet     Refill:  5       Call or return to clinic prn if these symptoms worsen or fail to improve as anticipated. I have reviewed the instructions with the patient, answering all questions to patient's satisfaction. Nacori received counseling on the following healthy behaviors: nutrition, exercise, and medication adherence  Reviewed prior labs and health maintenance. Continue current medications, diet and exercise. Discussed use, benefit, and side effects of prescribed medications. Barriers to medication compliance addressed. Patient given educational materials - see patient instructions. All patient questions answered. Patient voiced understanding.       Electronically signed by Lashay Combs MD on 9/23/2021 at 8:08 AM       (Please note that portions of this note were completed with a voice recognition program. Efforts were made to edit the dictations but occasionally words are mis-transcribed.)

## 2022-02-01 ENCOUNTER — OFFICE VISIT (OUTPATIENT)
Dept: OBGYN CLINIC | Age: 42
End: 2022-02-01
Payer: MEDICAID

## 2022-02-01 VITALS
DIASTOLIC BLOOD PRESSURE: 74 MMHG | BODY MASS INDEX: 41 KG/M2 | SYSTOLIC BLOOD PRESSURE: 110 MMHG | HEIGHT: 63 IN | WEIGHT: 231.4 LBS

## 2022-02-01 DIAGNOSIS — L65.9 HAIR THINNING: ICD-10-CM

## 2022-02-01 DIAGNOSIS — R10.2 PELVIC PAIN IN FEMALE: Primary | ICD-10-CM

## 2022-02-01 DIAGNOSIS — L70.9 ACNE, UNSPECIFIED ACNE TYPE: ICD-10-CM

## 2022-02-01 PROCEDURE — G8417 CALC BMI ABV UP PARAM F/U: HCPCS | Performed by: NURSE PRACTITIONER

## 2022-02-01 PROCEDURE — G8427 DOCREV CUR MEDS BY ELIG CLIN: HCPCS | Performed by: NURSE PRACTITIONER

## 2022-02-01 PROCEDURE — 99203 OFFICE O/P NEW LOW 30 MIN: CPT | Performed by: NURSE PRACTITIONER

## 2022-02-01 PROCEDURE — G8484 FLU IMMUNIZE NO ADMIN: HCPCS | Performed by: NURSE PRACTITIONER

## 2022-02-01 PROCEDURE — 1036F TOBACCO NON-USER: CPT | Performed by: NURSE PRACTITIONER

## 2022-02-01 ASSESSMENT — ENCOUNTER SYMPTOMS
CONSTIPATION: 0
DIARRHEA: 0
ABDOMINAL PAIN: 0
BACK PAIN: 0
ABDOMINAL DISTENTION: 0
COUGH: 0
SHORTNESS OF BREATH: 0

## 2022-02-01 NOTE — PROGRESS NOTES
Subjective:      Patient ID: Pamela Goode is being seen today for   Chief Complaint   Patient presents with   [address]     last seen 2/10/17    Hirsutism    Acne    Pelvic Pain     on & off        HPI  Here to discuss pelvic pain, acne and hair growth. Has has IUD since 2017. Has had pelvic pain for about 1 year. Random, recently become more intense. Happens maybe 2-3x/month. Feels like twisting, constant and lasts for 1-2 days. Uses ibuprofen and muscle relaxers which help somewhat. Has had 2 or 3 occasions where it was very intense and nothing helps. No cycles on Mirena. Good thru 2024 at 7 year dimitri of insertion. Denies changes in BM. Has soft stool daily. No changes in urinary habits. Has hx PCOS (dx 5 years ago). Has had hair thinning for a long time. Has lost \"almost half of her hair\" in the last 6 months and also struggling with acne. Discussed ordering pelvic US and FU for hair thinning w/PCP. Review of Systems   Constitutional: Negative for appetite change and fatigue. HENT: Negative for congestion and hearing loss. Eyes: Negative for visual disturbance. Respiratory: Negative for cough and shortness of breath. Cardiovascular: Negative for chest pain and palpitations. Gastrointestinal: Negative for abdominal distention, abdominal pain, constipation and diarrhea. Endocrine:        Reports hair loss in last 6 months  Increased acne around mouth and under nose   Genitourinary: Positive for pelvic pain. Negative for flank pain, frequency, menstrual problem and vaginal discharge. Musculoskeletal: Negative for back pain. Neurological: Negative for syncope and headaches. Psychiatric/Behavioral: Negative for behavioral problems.        Vitals:    02/01/22 1548   BP: 110/74   Site: Right Upper Arm   Position: Sitting   Cuff Size: Large Adult   Weight: 231 lb 6.4 oz (105 kg)   Height: 5' 3\" (1.6 m)     Current Outpatient Medications   Medication Sig Dispense Refill    ibuprofen Diagnosis Orders   1. Pelvic pain in female  [address]   2. Acne, unspecified acne type     3. Hair thinning      and her options. She was also counseled on her preventative health maintenance recommendations and follow-up of annual exam. Refer to plan and assessment. Recent Results (from the past 8736 hour(s))   Urinalysis, Routine    Collection Time: 08/16/21  8:15 PM   Result Value Ref Range    Color, UA YELLOW YELLOW    Turbidity UA CLEAR CLEAR    Glucose, Ur NEGATIVE NEGATIVE    Bilirubin Urine NEGATIVE NEGATIVE    Ketones, Urine NEGATIVE NEGATIVE    Specific Gravity, UA 1.015 1.005 - 1.030    Urine Hgb 3+ (A) NEGATIVE    pH, UA 7.0 5.0 - 8.0    Protein, UA NEGATIVE NEGATIVE    Urobilinogen, Urine Normal Normal    Nitrite, Urine NEGATIVE NEGATIVE    Leukocyte Esterase, Urine NEGATIVE NEGATIVE    Urinalysis Comments NOT REPORTED    Microscopic Urinalysis    Collection Time: 08/16/21  8:15 PM   Result Value Ref Range    -          WBC, UA 0 TO 2 0 - 5 /HPF    RBC, UA 10 TO 20 0 - 2 /HPF    Casts UA NOT REPORTED /LPF    Crystals, UA 0 TO 2 AMMONIUM PHOSPHATE (A) None /HPF    Epithelial Cells UA 2 TO 5 0 - 5 /HPF    Renal Epithelial, UA NOT REPORTED 0 /HPF    Bacteria, UA MODERATE (A) None    Mucus, UA NOT REPORTED None    Trichomonas, UA NOT REPORTED None    Amorphous, UA 1+ (A) None    Other Observations UA NOT REPORTED NOT REQ. Yeast, UA NOT REPORTED None   POCT urine pregnancy    Collection Time: 08/16/21  9:42 PM   Result Value Ref Range    HCG, Pregnancy Urine (POC) NEGATIVE NEGATIVE   POCT urine pregnancy    Collection Time: 08/16/21  9:45 PM   Result Value Ref Range    Preg Test, Ur negative     QC OK? ok    Culture, Urine    Collection Time: 08/16/21  9:59 PM    Specimen: Urine, clean catch   Result Value Ref Range    Specimen Description . CLEAN CATCH URINE     Special Requests NOT REPORTED     Culture NO SIGNIFICANT GROWTH    Basic Metabolic Panel    Collection Time: 09/04/21 10:00 AM Result Value Ref Range    Glucose 127 (H) 70 - 99 mg/dL    BUN 10 6 - 20 mg/dL    CREATININE 1.05 (H) 0.50 - 0.90 mg/dL    Bun/Cre Ratio 10 9 - 20    Calcium 9.0 8.6 - 10.4 mg/dL    Sodium 141 135 - 144 mmol/L    Potassium 3.9 3.7 - 5.3 mmol/L    Chloride 108 (H) 98 - 107 mmol/L    CO2 22 20 - 31 mmol/L    Anion Gap 11 9 - 17 mmol/L    GFR Non-African American 59 (L) >60 mL/min    GFR African American >60 >60 mL/min    GFR Comment          GFR Staging NOT REPORTED    CBC Auto Differential    Collection Time: 09/04/21 10:00 AM   Result Value Ref Range    WBC 6.1 3.5 - 11.3 k/uL    RBC 4.77 3.95 - 5.11 m/uL    Hemoglobin 14.2 11.9 - 15.1 g/dL    Hematocrit 42.7 36.3 - 47.1 %    MCV 89.5 82.6 - 102.9 fL    MCH 29.8 25.2 - 33.5 pg    MCHC 33.3 28.4 - 34.8 g/dL    RDW 12.0 11.8 - 14.4 %    Platelets 545 (H) 375 - 453 k/uL    MPV 8.4 8.1 - 13.5 fL    NRBC Automated 0.0 0.0 per 100 WBC    Differential Type NOT REPORTED     Seg Neutrophils 51 36 - 65 %    Lymphocytes 34 24 - 43 %    Monocytes 11 3 - 12 %    Eosinophils % 2 1 - 4 %    Basophils 1 0 - 2 %    Immature Granulocytes 1 (H) 0 %    Segs Absolute 3.15 1.50 - 8.10 k/uL    Absolute Lymph # 2.09 1.10 - 3.70 k/uL    Absolute Mono # 0.67 0.10 - 1.20 k/uL    Absolute Eos # 0.15 0.00 - 0.44 k/uL    Basophils Absolute 0.04 0.00 - 0.20 k/uL    Absolute Immature Granulocyte 0.03 0.00 - 0.30 k/uL    WBC Morphology NOT REPORTED     RBC Morphology NOT REPORTED     Platelet Estimate NOT REPORTED    Hepatic Function Panel    Collection Time: 09/04/21 10:00 AM   Result Value Ref Range    Albumin 4.1 3.5 - 5.2 g/dL    Alkaline Phosphatase 55 35 - 104 U/L    ALT 25 5 - 33 U/L    AST 25 <32 U/L    Total Bilirubin 0.26 (L) 0.3 - 1.2 mg/dL    Bilirubin, Direct 0.09 <0.31 mg/dL    Bilirubin, Indirect 0.17 0.00 - 1.00 mg/dL    Total Protein 7.5 6.4 - 8.3 g/dL    Globulin NOT REPORTED 1.5 - 3.8 g/dL    Albumin/Globulin Ratio NOT REPORTED 1.0 - 2.5   Lipase    Collection Time: 09/04/21 10:00 AM   Result Value Ref Range    Lipase 28 13 - 60 U/L   HCG Qualitative, Serum    Collection Time: 09/04/21 10:00 AM   Result Value Ref Range    hCG Qual NEGATIVE NEGATIVE   Urinalysis    Collection Time: 09/04/21 12:10 PM   Result Value Ref Range    Color, UA YELLOW YELLOW    Turbidity UA SLIGHTLY CLOUDY (A) CLEAR    Glucose, Ur NEGATIVE NEGATIVE    Bilirubin Urine NEGATIVE NEGATIVE    Ketones, Urine 2+ (A) NEGATIVE    Specific Gravity, UA 1.032 (H) 1.005 - 1.030    Urine Hgb 2+ (A) NEGATIVE    pH, UA 6.0 5.0 - 8.0    Protein, UA NEGATIVE NEGATIVE    Urobilinogen, Urine Normal Normal    Nitrite, Urine NEGATIVE NEGATIVE    Leukocyte Esterase, Urine NEGATIVE NEGATIVE    Urinalysis Comments NOT REPORTED    Microscopic Urinalysis    Collection Time: 09/04/21 12:10 PM   Result Value Ref Range    -          WBC, UA 0 TO 2 0 - 5 /HPF    RBC, UA 10 TO 20 0 - 2 /HPF    Casts UA NOT REPORTED /LPF    Crystals, UA NOT REPORTED None /HPF    Epithelial Cells UA 10 TO 20 0 - 5 /HPF    Renal Epithelial, UA NOT REPORTED 0 /HPF    Bacteria, UA NOT REPORTED None    Mucus, UA 1+ (A) None    Trichomonas, UA NOT REPORTED None    Amorphous, UA NOT REPORTED None    Other Observations UA NOT REPORTED NOT REQ.     Yeast, UA NOT REPORTED None         PLAN:   Pelvic US ordered  FU w/PCP to discuss hair thinning and acne  Pain diary- track to see if cyclic  May use ibuprofen or naproxen for pain

## 2022-02-28 ENCOUNTER — OFFICE VISIT (OUTPATIENT)
Dept: FAMILY MEDICINE CLINIC | Age: 42
End: 2022-02-28
Payer: MEDICAID

## 2022-02-28 VITALS
WEIGHT: 233.4 LBS | TEMPERATURE: 97.7 F | BODY MASS INDEX: 41.34 KG/M2 | OXYGEN SATURATION: 90 % | HEART RATE: 104 BPM | SYSTOLIC BLOOD PRESSURE: 124 MMHG | DIASTOLIC BLOOD PRESSURE: 76 MMHG

## 2022-02-28 DIAGNOSIS — Z13.1 SCREENING FOR DIABETES MELLITUS: ICD-10-CM

## 2022-02-28 DIAGNOSIS — E66.01 MORBID OBESITY WITH BMI OF 40.0-44.9, ADULT (HCC): ICD-10-CM

## 2022-02-28 DIAGNOSIS — E28.2 PCOS (POLYCYSTIC OVARIAN SYNDROME): Primary | ICD-10-CM

## 2022-02-28 LAB — HBA1C MFR BLD: 5.4 %

## 2022-02-28 PROCEDURE — 99213 OFFICE O/P EST LOW 20 MIN: CPT | Performed by: FAMILY MEDICINE

## 2022-02-28 PROCEDURE — 1036F TOBACCO NON-USER: CPT | Performed by: FAMILY MEDICINE

## 2022-02-28 PROCEDURE — G8427 DOCREV CUR MEDS BY ELIG CLIN: HCPCS | Performed by: FAMILY MEDICINE

## 2022-02-28 PROCEDURE — 83036 HEMOGLOBIN GLYCOSYLATED A1C: CPT | Performed by: FAMILY MEDICINE

## 2022-02-28 PROCEDURE — G8417 CALC BMI ABV UP PARAM F/U: HCPCS | Performed by: FAMILY MEDICINE

## 2022-02-28 PROCEDURE — G8484 FLU IMMUNIZE NO ADMIN: HCPCS | Performed by: FAMILY MEDICINE

## 2022-02-28 RX ORDER — SPIRONOLACTONE 25 MG/1
25 TABLET ORAL DAILY
Qty: 30 TABLET | Refills: 3 | Status: SHIPPED | OUTPATIENT
Start: 2022-02-28 | End: 2022-09-29

## 2022-02-28 ASSESSMENT — PATIENT HEALTH QUESTIONNAIRE - PHQ9
SUM OF ALL RESPONSES TO PHQ QUESTIONS 1-9: 0
SUM OF ALL RESPONSES TO PHQ QUESTIONS 1-9: 0
SUM OF ALL RESPONSES TO PHQ9 QUESTIONS 1 & 2: 0
1. LITTLE INTEREST OR PLEASURE IN DOING THINGS: 0
SUM OF ALL RESPONSES TO PHQ QUESTIONS 1-9: 0
SUM OF ALL RESPONSES TO PHQ QUESTIONS 1-9: 0
2. FEELING DOWN, DEPRESSED OR HOPELESS: 0

## 2022-02-28 NOTE — PROGRESS NOTES
General FM note    Karl Cannon is a 46 y.o. female who presents today for follow up on her  medical conditions as noted below. Karl Cannon is c/o of   Chief Complaint   Patient presents with    Discuss Labs     PCOS symptoms       Patient Active Problem List:     Abnormal uterine bleeding (AUB)     Menorrhagia with irregular cycle     PCOS (polycystic ovarian syndrome)     Gastroesophageal reflux disease without esophagitis     Past Medical History:   Diagnosis Date    Anxiety     Dizziness     Gall bladder pain     Headache(784.0)     HSV-1 (herpes simplex virus 1) infection     Hyperthyroidism       Past Surgical History:   Procedure Laterality Date    ENDOMETRIAL BIOPSY  08/09/2016    negative atypia or malignancy    INTRAUTERINE DEVICE INSERTION  01/13/2017    Mirena     Family History   Problem Relation Age of Onset    Lupus Father     Stroke Father     Alcohol Abuse Father     Alcohol Abuse Mother     Thyroid Disease Mother      Current Outpatient Medications   Medication Sig Dispense Refill    spironolactone (ALDACTONE) 25 MG tablet Take 1 tablet by mouth daily 30 tablet 3    ibuprofen (ADVIL;MOTRIN) 800 MG tablet Take 1 tablet by mouth every 6 hours as needed for Pain 120 tablet 1    metFORMIN (GLUCOPHAGE) 500 MG tablet TAKE ONE TABLET BY MOUTH TWICE A DAY WITH MEALS 60 tablet 2    cyclobenzaprine (FLEXERIL) 5 MG tablet Take 1 tablet by mouth nightly as needed for Muscle spasms 30 tablet 0    omeprazole (PRILOSEC) 40 MG delayed release capsule Take 1 capsule by mouth daily 30 capsule 3    Multiple Vitamin (MULTI VITAMIN DAILY PO) Take by mouth       No current facility-administered medications for this visit. ALLERGIES:    Allergies   Allergen Reactions    Bee Venom        Social History     Tobacco Use    Smoking status: Former Smoker    Smokeless tobacco: Never Used   Substance Use Topics    Alcohol use:  Yes     Alcohol/week: 0.0 standard drinks     Comment: socially Body mass index is 41.34 kg/m². /76   Pulse 104   Temp 97.7 °F (36.5 °C)   Wt 233 lb 6.4 oz (105.9 kg)   SpO2 90%   BMI 41.34 kg/m²     Subjective:      HPI    46 y.o. female was seen by her NP Gyn --that nurse petitioner told her she should follow-up with me to get blood work done for possible PCOS. The patient also states that she has discomfort pelvic BX and ultrasound was ordered by the nurse practitioner. But the patient states over the last 6 months she has lost so much hair. More so in the big clumps. She also has red patches on her legs. And she has been having acne issues which she has never had before. The patient states that she started exercising that she has been eating a healthy diet but she cannot lose this weight. Review of Systems   Constitutional: Negative for fever and unexpected weight change. Pertinent items are noted in HPI. Objective:   Physical Exam  Constitutional: VS (see above). General appearance: normal development, habitus and attention, no deformities. No distress. Eyes: normal conjunctiva and lids. CAV: RRR, no RMG. No edema lower extremities. Pulmo: CTA bilateral, no CWR. Skin: no rashes, lesions or ulcers. Musculoskeletal: normal gait. Nails: no clubbing or cyanosis. Psychiatric: alert and oriented to place, time and person. Normal mood and affect. A1c is 5.4. Assessment:       Diagnosis Orders   1. PCOS (polycystic ovarian syndrome)  17-Hydroxyprogesterone    DHEA-Sulfate    Insulin, total    Comprehensive Metabolic Panel    TSH with Reflex    Testosterone    Testosterone, Free    spironolactone (ALDACTONE) 25 MG tablet   2. Screening for diabetes mellitus  POCT glycosylated hemoglobin (Hb A1C)   3.  Morbid obesity with BMI of 40.0-44.9, adult (HCC)  17-Hydroxyprogesterone    DHEA-Sulfate    Insulin, total    Comprehensive Metabolic Panel    TSH with Reflex    Testosterone    Testosterone, Free    spironolactone (ALDACTONE) 25 MG tablet Plan:   Patient has been taking Metformin and she feels it helps. She states when being on a weight loss medication she feels much better with losing weight and I discussed with her that this is the treatment of choice for PCOS. Again I did order multiple blood work we will start her additional spironolactone. Patient will call for any changes. Return in about 2 months (around 4/28/2022), or if symptoms worsen or fail to improve. Orders Placed This Encounter   Procedures    17-Hydroxyprogesterone     Standing Status:   Future     Standing Expiration Date:   2/28/2023    DHEA-Sulfate     Standing Status:   Future     Standing Expiration Date:   2/28/2023    Insulin, total     Standing Status:   Future     Standing Expiration Date:   2/28/2023    Comprehensive Metabolic Panel     Standing Status:   Future     Standing Expiration Date:   2/28/2023    TSH with Reflex     Standing Status:   Future     Standing Expiration Date:   3/1/2023    Testosterone     Standing Status:   Future     Standing Expiration Date:   2/28/2023    Testosterone, Free     Standing Status:   Future     Standing Expiration Date:   2/28/2023    POCT glycosylated hemoglobin (Hb A1C)     Orders Placed This Encounter   Medications    spironolactone (ALDACTONE) 25 MG tablet     Sig: Take 1 tablet by mouth daily     Dispense:  30 tablet     Refill:  3       Call or return to clinic prn if these symptoms worsen or fail to improve as anticipated. I have reviewed the instructions with the patient, answering all questions to patient's satisfaction. Nacori received counseling on the following healthy behaviors: nutrition, exercise, and medication adherence  Reviewed prior labs and health maintenance. Continue current medications, diet and exercise. Discussed use, benefit, and side effects of prescribed medications. Barriers to medication compliance addressed.    Patient given educational materials - see patient instructions. All patient questions answered. Patient voiced understanding.       Electronically signed by Samantha Ramos MD on 3/1/2022 at 6:26 AM       (Please note that portions of this note were completed with a voice recognition program. Efforts were made to edit the dictations but occasionally words are mis-transcribed.)

## 2022-04-18 ASSESSMENT — ENCOUNTER SYMPTOMS
ABDOMINAL PAIN: 0
COUGH: 0
SHORTNESS OF BREATH: 0
ABDOMINAL DISTENTION: 0
CONSTIPATION: 0
DIARRHEA: 0
BACK PAIN: 0

## 2022-04-18 NOTE — PROGRESS NOTES
[address] [facility] Jose Luis Dodd  [address] [place] [address]  Dept: [phone]    DATE OF VISIT:  22        History and Physical    Glory Pena    :  1980  CHIEF COMPLAINT:  No chief complaint on file. HPI :   Glory Pena is a 46 y.o. female here for her annual exam.     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 Working on healthy eating and trying to eat healthy. Seen 2022 for pelvic pain. Happens maybe 2-3x/month. Feels like twisting, constant and lasts for 1-2 days. Doesn't feel like cramps. Feels like it's \"at the top of her cervix. \" Denies dysuria, constipation, diarrhea. Ibuprofen helps sometimes. No risk for STI exposure- declines cx today. Pelvic US 22 - normal uterus, IUD properly positioned, nabothian cysts on cervix and PCO pattern BL ovaries. Recommended using NSAIDs at the start of the pain and asked to keep a pain diary. C/O significant hair loss over the last year- \"handfuls. \"  Has had Covid twice in the last year, but pt states that it was thinning prior to that. PCP ordered some PCOS labs. Pt agreeable to derm referral.    Menarche at 10.   Periods are absent w/mirena   Previously had severe HMB and dysmenorrhea    _____________________________________________________________________  Past Medical History:   Diagnosis Date    Anxiety     Dizziness     Gall bladder pain     Headache(784.0)     HSV-1 (herpes simplex virus 1) infection     Hyperthyroidism                                                                    Past Surgical History:   Procedure Laterality Date    ENDOMETRIAL BIOPSY  2016    negative atypia or malignancy    INTRAUTERINE DEVICE INSERTION  2017    Mirena     Family History   Problem Relation Age of Onset    Lupus Father     Stroke Father     Alcohol Abuse Father     Alcohol Abuse Mother     Thyroid Disease Mother Social History     Tobacco Use   Smoking Status Former Smoker   Smokeless Tobacco Never Used     Social History     Substance and Sexual Activity   Alcohol Use Yes    Alcohol/week: 0.0 standard drinks    Comment: socially     Current Outpatient Medications   Medication Sig Dispense Refill    spironolactone (ALDACTONE) 25 MG tablet Take 1 tablet by mouth daily 30 tablet 3    ibuprofen (ADVIL;MOTRIN) 800 MG tablet Take 1 tablet by mouth every 6 hours as needed for Pain 120 tablet 1    metFORMIN (GLUCOPHAGE) 500 MG tablet TAKE ONE TABLET BY MOUTH TWICE A DAY WITH MEALS 60 tablet 2    cyclobenzaprine (FLEXERIL) 5 MG tablet Take 1 tablet by mouth nightly as needed for Muscle spasms 30 tablet 0    omeprazole (PRILOSEC) 40 MG delayed release capsule Take 1 capsule by mouth daily 30 capsule 3    Multiple Vitamin (MULTI VITAMIN DAILY PO) Take by mouth       No current facility-administered medications for this visit. Allergies: Allergies   Allergen Reactions    Bee Venom        Gynecologic History:  No LMP recorded. Patient has had an implant. Sexually Active: Yes  How many partners in the last 12 months- 1  Partners: male, monogamous  Discussed using condoms for STI protection   Dyspareunia: occasionally positional  STD History: No  Birth Control: Yes Mirena. Expires   Pap History:  NILM  Gardasil: NA  Family hx Breast, Ovarian, Colorectal Cancer: denies       OB History    Para Term  AB Living   1 1 1 0 0 1   SAB IAB Ectopic Molar Multiple Live Births   0 0 0 0 0 1       Review of Systems   Constitutional: Negative for appetite change and fatigue. HENT: Negative for congestion and hearing loss. Eyes: Negative for visual disturbance. Respiratory: Negative for cough and shortness of breath. Cardiovascular: Negative for chest pain and palpitations. Gastrointestinal: Negative for abdominal distention, abdominal pain, constipation and diarrhea.    Endocrine:        Hair thinning/loss x 1 year   Genitourinary: Positive for pelvic pain. Negative for flank pain, frequency, menstrual problem and vaginal discharge. Musculoskeletal: Negative for back pain. Neurological: Negative for syncope and headaches. Psychiatric/Behavioral: Negative for behavioral problems. There were no vitals taken for this visit. Physical Exam  Constitutional:       Appearance: She is well-developed. HENT:      Head: Normocephalic. Eyes:      Extraocular Movements: Extraocular movements intact. Conjunctiva/sclera: Conjunctivae normal.   Neck:      Thyroid: No thyromegaly. Trachea: No tracheal deviation. Pulmonary:      Effort: Pulmonary effort is normal. No respiratory distress. Chest:   Breasts: Breasts are symmetrical.      Right: No inverted nipple. Left: No inverted nipple, mass, nipple discharge, skin change or tenderness. Abdominal:      General: There is no distension. Palpations: Abdomen is soft. There is no mass. Tenderness: There is no abdominal tenderness. Genitourinary:     Labia:         Right: No rash or lesion. Left: No rash or lesion. Vagina: No vaginal discharge or tenderness. Cervix: No cervical motion tenderness, discharge or friability. Uterus: Not deviated, not enlarged and not fixed. Adnexa:         Right: No mass, tenderness or fullness. Left: No mass, tenderness or fullness. Musculoskeletal:         General: No tenderness. Normal range of motion. Cervical back: Normal range of motion. Skin:     General: Skin is warm and dry. Neurological:      General: No focal deficit present. Mental Status: She is alert and oriented to person, place, and time. Mental status is at baseline. Psychiatric:         Mood and Affect: Mood normal.         Behavior: Behavior normal.         Thought Content:  Thought content normal.         Judgment: Judgment normal.             ASSESSMENT:     39

## 2022-04-19 ENCOUNTER — HOSPITAL ENCOUNTER (OUTPATIENT)
Age: 42
Discharge: HOME | End: 2022-04-19
Payer: MEDICAID

## 2022-04-19 ENCOUNTER — OFFICE VISIT (OUTPATIENT)
Dept: OBGYN CLINIC | Age: 42
End: 2022-04-19
Payer: MEDICAID

## 2022-04-19 ENCOUNTER — HOSPITAL ENCOUNTER (OUTPATIENT)
Age: 42
Setting detail: SPECIMEN
Discharge: HOME | End: 2022-04-19

## 2022-04-19 VITALS
SYSTOLIC BLOOD PRESSURE: 100 MMHG | BODY MASS INDEX: 42.38 KG/M2 | HEIGHT: 63 IN | DIASTOLIC BLOOD PRESSURE: 64 MMHG | WEIGHT: 239.2 LBS

## 2022-04-19 DIAGNOSIS — L65.9 HAIR THINNING: ICD-10-CM

## 2022-04-19 DIAGNOSIS — E66.01 MORBID OBESITY WITH BMI OF 40.0-44.9, ADULT (HCC): ICD-10-CM

## 2022-04-19 DIAGNOSIS — R10.2 PELVIC PAIN IN FEMALE: ICD-10-CM

## 2022-04-19 DIAGNOSIS — Z12.31 ENCOUNTER FOR SCREENING MAMMOGRAM FOR BREAST CANCER: ICD-10-CM

## 2022-04-19 DIAGNOSIS — Z01.419 ENCOUNTER FOR GYNECOLOGICAL EXAMINATION: Primary | ICD-10-CM

## 2022-04-19 DIAGNOSIS — E28.2 PCOS (POLYCYSTIC OVARIAN SYNDROME): ICD-10-CM

## 2022-04-19 LAB
ALBUMIN SERPL-MCNC: 4.3 G/DL (ref 3.5–5.2)
ALBUMIN/GLOBULIN RATIO: 1.3 (ref 1–2.5)
ALP BLD-CCNC: 43 U/L (ref 35–104)
ALT SERPL-CCNC: 18 U/L (ref 5–33)
ANION GAP SERPL CALCULATED.3IONS-SCNC: 13 MMOL/L (ref 9–17)
AST SERPL-CCNC: 17 U/L
BILIRUB SERPL-MCNC: 0.22 MG/DL (ref 0.3–1.2)
BUN BLDV-MCNC: 14 MG/DL (ref 6–20)
CALCIUM SERPL-MCNC: 9.2 MG/DL (ref 8.6–10.4)
CHLORIDE BLD-SCNC: 108 MMOL/L (ref 98–107)
CO2: 19 MMOL/L (ref 20–31)
CREAT SERPL-MCNC: 0.98 MG/DL (ref 0.5–0.9)
DHEAS (DHEA SULFATE): 112 UG/DL (ref 45–270)
GFR AFRICAN AMERICAN: >60 ML/MIN
GFR NON-AFRICAN AMERICAN: >60 ML/MIN
GFR SERPL CREATININE-BSD FRML MDRD: ABNORMAL ML/MIN/{1.73_M2}
GLUCOSE BLD-MCNC: 98 MG/DL (ref 70–99)
INSULIN REFERENCE RANGE:: NORMAL
INSULIN: 30.2 MU/L
POTASSIUM SERPL-SCNC: 4.2 MMOL/L (ref 3.7–5.3)
SEX HORMONE BINDING GLOBULIN: 20 NMOL/L (ref 30–135)
SODIUM BLD-SCNC: 140 MMOL/L (ref 135–144)
TESTOSTERONE FREE-NONMALE: 3.5 PG/ML (ref 1.3–9.2)
TESTOSTERONE TOTAL: 15 NG/DL (ref 20–70)
TOTAL PROTEIN: 7.5 G/DL (ref 6.4–8.3)
TSH SERPL DL<=0.05 MIU/L-ACNC: 4.83 UIU/ML (ref 0.3–5)

## 2022-04-19 PROCEDURE — 84443 ASSAY THYROID STIM HORMONE: CPT

## 2022-04-19 PROCEDURE — 84270 ASSAY OF SEX HORMONE GLOBUL: CPT

## 2022-04-19 PROCEDURE — 83498 ASY HYDROXYPROGESTERONE 17-D: CPT

## 2022-04-19 PROCEDURE — 80053 COMPREHEN METABOLIC PANEL: CPT

## 2022-04-19 PROCEDURE — 99395 PREV VISIT EST AGE 18-39: CPT | Performed by: NURSE PRACTITIONER

## 2022-04-19 PROCEDURE — 83525 ASSAY OF INSULIN: CPT

## 2022-04-19 PROCEDURE — 84403 ASSAY OF TOTAL TESTOSTERONE: CPT

## 2022-04-19 PROCEDURE — 82627 DEHYDROEPIANDROSTERONE: CPT

## 2022-04-20 LAB
HPV SAMPLE: NORMAL
HPV, GENOTYPE 16: NOT DETECTED
HPV, GENOTYPE 18: NOT DETECTED
HPV, HIGH RISK OTHER: NOT DETECTED
HPV, INTERPRETATION: NORMAL
SPECIMEN DESCRIPTION: NORMAL

## 2022-04-21 ENCOUNTER — TELEPHONE (OUTPATIENT)
Dept: OBGYN CLINIC | Age: 42
End: 2022-04-21

## 2022-04-21 DIAGNOSIS — L65.9 HAIR THINNING: Primary | ICD-10-CM

## 2022-04-21 DIAGNOSIS — L70.9 ACNE, UNSPECIFIED ACNE TYPE: ICD-10-CM

## 2022-04-21 NOTE — TELEPHONE ENCOUNTER
----- Message from WENDI Sheppard CNP sent at 4/19/2022  9:44 AM EDT -----  Please send derm referral to [facility] office for hair thinning/loss and acne

## 2022-04-24 LAB — 17 OH PROGESTERONE: 145.81 NG/DL

## 2022-04-25 LAB — CYTOLOGY REPORT: NORMAL

## 2022-05-04 ENCOUNTER — OFFICE VISIT (OUTPATIENT)
Dept: FAMILY MEDICINE CLINIC | Age: 42
End: 2022-05-04
Payer: MEDICAID

## 2022-05-04 VITALS
TEMPERATURE: 97.9 F | WEIGHT: 240.8 LBS | OXYGEN SATURATION: 99 % | BODY MASS INDEX: 42.66 KG/M2 | HEART RATE: 95 BPM | SYSTOLIC BLOOD PRESSURE: 110 MMHG | HEIGHT: 63 IN | DIASTOLIC BLOOD PRESSURE: 65 MMHG

## 2022-05-04 DIAGNOSIS — E66.01 MORBID OBESITY WITH BMI OF 40.0-44.9, ADULT (HCC): ICD-10-CM

## 2022-05-04 PROCEDURE — 1036F TOBACCO NON-USER: CPT | Performed by: FAMILY MEDICINE

## 2022-05-04 PROCEDURE — 99214 OFFICE O/P EST MOD 30 MIN: CPT | Performed by: FAMILY MEDICINE

## 2022-05-04 PROCEDURE — G8427 DOCREV CUR MEDS BY ELIG CLIN: HCPCS | Performed by: FAMILY MEDICINE

## 2022-05-04 PROCEDURE — G8417 CALC BMI ABV UP PARAM F/U: HCPCS | Performed by: FAMILY MEDICINE

## 2022-05-04 RX ORDER — PHENTERMINE HYDROCHLORIDE 37.5 MG/1
37.5 TABLET ORAL
Qty: 30 TABLET | Refills: 0 | Status: SHIPPED | OUTPATIENT
Start: 2022-05-04 | End: 2022-06-06 | Stop reason: SDUPTHER

## 2022-05-04 ASSESSMENT — PATIENT HEALTH QUESTIONNAIRE - PHQ9
1. LITTLE INTEREST OR PLEASURE IN DOING THINGS: 0
2. FEELING DOWN, DEPRESSED OR HOPELESS: 0
SUM OF ALL RESPONSES TO PHQ QUESTIONS 1-9: 0
SUM OF ALL RESPONSES TO PHQ9 QUESTIONS 1 & 2: 0
SUM OF ALL RESPONSES TO PHQ QUESTIONS 1-9: 0

## 2022-05-04 NOTE — PROGRESS NOTES
General FM note    Beth Dobbs is a 46 y.o. female who presents today for follow up on her  medical conditions as noted below. Beth Dobbs is c/o of   Chief Complaint   Patient presents with   [employer]    Weight Management       Patient Active Problem List:     Abnormal uterine bleeding (AUB)     Menorrhagia with irregular cycle     PCOS (polycystic ovarian syndrome)     Gastroesophageal reflux disease without esophagitis     Past Medical History:   Diagnosis Date    Anxiety     Dizziness     Gall bladder pain     Headache(784.0)     HSV-1 (herpes simplex virus 1) infection     Hyperthyroidism       Past Surgical History:   Procedure Laterality Date    ENDOMETRIAL BIOPSY  08/09/2016    negative atypia or malignancy    INTRAUTERINE DEVICE INSERTION  01/13/2017    Mirena     Family History   Problem Relation Age of Onset    Lupus Father     Stroke Father     Alcohol Abuse Father     Alcohol Abuse Mother     Thyroid Disease Mother      Current Outpatient Medications   Medication Sig Dispense Refill    phentermine (ADIPEX-P) 37.5 MG tablet Take 1 tablet by mouth every morning (before breakfast) for 30 days. 30 tablet 0    spironolactone (ALDACTONE) 25 MG tablet Take 1 tablet by mouth daily 30 tablet 3    ibuprofen (ADVIL;MOTRIN) 800 MG tablet Take 1 tablet by mouth every 6 hours as needed for Pain 120 tablet 1    metFORMIN (GLUCOPHAGE) 500 MG tablet TAKE ONE TABLET BY MOUTH TWICE A DAY WITH MEALS 60 tablet 2    cyclobenzaprine (FLEXERIL) 5 MG tablet Take 1 tablet by mouth nightly as needed for Muscle spasms 30 tablet 0    omeprazole (PRILOSEC) 40 MG delayed release capsule Take 1 capsule by mouth daily 30 capsule 3    Multiple Vitamin (MULTI VITAMIN DAILY PO) Take by mouth       No current facility-administered medications for this visit.      ALLERGIES:    Allergies   Allergen Reactions    Bee Venom        Social History     Tobacco Use    Smoking status: Former Smoker    Smokeless tobacco: Never Used   Substance Use Topics    Alcohol use: Yes     Alcohol/week: 0.0 standard drinks     Comment: socially      Body mass index is 42.66 kg/m². /65   Pulse 95   Temp 97.9 °F (36.6 °C)   Ht 5' 3\" (1.6 m)   Wt 240 lb 12.8 oz (109.2 kg)   SpO2 99%   BMI 42.66 kg/m²     Subjective:      HPI    46 y.o. female coming today for weight check. Would like to restart Adipex. The patient feels that she did quite well with her weight. She kept it more or less off. But she tells me that she did not  the metformin at 1 point and she gained 5 pounds. She will she started the metformin again. Diet: cut down a lot on sugar and sweets, small portions. Her \"habits are not the best\" - tries to make something healthy. Exercises: not a lot. Side effects: none from previous use. 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 She feels with the new position she will have more time for herself so she needs to exercise more. She states before she works 60 hours a week and hopefully she is able to increase her exercises. Review of Systems   Constitutional: Negative for fever and unexpected weight change. Pertinent items are noted in HPI. Objective:   Physical Exam  Constitutional: VS (see above). General appearance: normal development, habitus and attention, no deformities. No distress. Eyes: normal conjunctiva and lids. CAV: RRR, no RMG. No edema lower extremities. Pulmo: CTA bilateral, no CWR. Skin: no rashes, lesions or ulcers. Musculoskeletal: normal gait. Nails: no clubbing or cyanosis. Psychiatric: alert and oriented to place, time and person. Normal mood and affect. Assessment:       Diagnosis Orders   1. Morbid obesity with BMI of 40.0-44.9, adult (HCC)  phentermine (ADIPEX-P) 37.5 MG tablet       Plan:      1. Prescription of Adipex provided. Continue exercise and diet. Follow-up as indicated. Patient will continue current diet and exercise regimen.   I discussed with her to exercise at least 30 minutes 5 times a week. Decrease carbohydrate intake. Increase fibers and protein. See me back in 4 weeks for weight check. Call if any changes. Stop Adipex if you have any side effects. I discussed with patient again that in the weight loss is quite important for her overall health. The metformin works quite nicely for hormone imbalance and as it seems like it works for her weight loss as well. The patient is aware that she needs to lose weight and that she needs to work on her health. Return in about 4 weeks (around 6/1/2022), or if symptoms worsen or fail to improve, for weight. No orders of the defined types were placed in this encounter. Orders Placed This Encounter   Medications    phentermine (ADIPEX-P) 37.5 MG tablet     Sig: Take 1 tablet by mouth every morning (before breakfast) for 30 days. Dispense:  30 tablet     Refill:  0       Call or return to clinic prn if these symptoms worsen or fail to improve as anticipated. I have reviewed the instructions with the patient, answering all questions to her satisfaction. Nacori received counseling on the following healthy behaviors: nutrition, exercise, and medication adherence  Reviewed prior labs and health maintenance. Continue current medications, diet and exercise. Discussed use, benefit, and side effects of prescribed medications. Barriers to medication compliance addressed. Patient given educational materials - see patient instructions. All patient questions answered. Patient voiced understanding.       Electronically signed by Kirk Alan MD on 5/5/2022 at 6:27 AM       (Please note that portions of this note were completed with a voice recognition program. Efforts were made to edit the dictations but occasionally words are mis-transcribed.)

## 2022-05-13 DIAGNOSIS — K21.9 GASTROESOPHAGEAL REFLUX DISEASE, UNSPECIFIED WHETHER ESOPHAGITIS PRESENT: ICD-10-CM

## 2022-05-16 RX ORDER — OMEPRAZOLE 40 MG/1
CAPSULE, DELAYED RELEASE ORAL
Qty: 30 CAPSULE | Refills: 3 | Status: SHIPPED | OUTPATIENT
Start: 2022-05-16

## 2022-05-25 DIAGNOSIS — E28.2 PCOS (POLYCYSTIC OVARIAN SYNDROME): ICD-10-CM

## 2022-06-06 ENCOUNTER — TELEMEDICINE (OUTPATIENT)
Dept: FAMILY MEDICINE CLINIC | Age: 42
End: 2022-06-06
Payer: MEDICAID

## 2022-06-06 DIAGNOSIS — E66.01 MORBID OBESITY WITH BMI OF 40.0-44.9, ADULT (HCC): ICD-10-CM

## 2022-06-06 PROCEDURE — 99213 OFFICE O/P EST LOW 20 MIN: CPT | Performed by: FAMILY MEDICINE

## 2022-06-06 PROCEDURE — G8427 DOCREV CUR MEDS BY ELIG CLIN: HCPCS | Performed by: FAMILY MEDICINE

## 2022-06-06 RX ORDER — PHENTERMINE HYDROCHLORIDE 37.5 MG/1
37.5 TABLET ORAL
Qty: 30 TABLET | Refills: 0 | Status: SHIPPED | OUTPATIENT
Start: 2022-06-06 | End: 2022-07-06 | Stop reason: SDUPTHER

## 2022-06-06 ASSESSMENT — PATIENT HEALTH QUESTIONNAIRE - PHQ9
SUM OF ALL RESPONSES TO PHQ QUESTIONS 1-9: 0
SUM OF ALL RESPONSES TO PHQ9 QUESTIONS 1 & 2: 0
2. FEELING DOWN, DEPRESSED OR HOPELESS: 0
SUM OF ALL RESPONSES TO PHQ QUESTIONS 1-9: 0
SUM OF ALL RESPONSES TO PHQ QUESTIONS 1-9: 0
1. LITTLE INTEREST OR PLEASURE IN DOING THINGS: 0
SUM OF ALL RESPONSES TO PHQ QUESTIONS 1-9: 0

## 2022-06-06 NOTE — PROGRESS NOTES
General FM note    TeleHealth encounter -- Audio/Visual (During [facility]-66 public health emergency)    Lnony Wilson is a 46 y.o. female who presents today for follow up on her  medical conditions as noted below. Lonny Wilson is c/o of   Chief Complaint   Patient presents with    Weight Management       Patient Active Problem List:     Abnormal uterine bleeding (AUB)     Menorrhagia with irregular cycle     PCOS (polycystic ovarian syndrome)     Gastroesophageal reflux disease without esophagitis     Past Medical History:   Diagnosis Date    Anxiety     Dizziness     Gall bladder pain     Headache(784.0)     HSV-1 (herpes simplex virus 1) infection     Hyperthyroidism       Past Surgical History:   Procedure Laterality Date    ENDOMETRIAL BIOPSY  08/09/2016    negative atypia or malignancy    INTRAUTERINE DEVICE INSERTION  01/13/2017    Mirena     Family History   Problem Relation Age of Onset    Lupus Father     Stroke Father     Alcohol Abuse Father     Alcohol Abuse Mother     Thyroid Disease Mother      Current Outpatient Medications   Medication Sig Dispense Refill    phentermine (ADIPEX-P) 37.5 MG tablet Take 1 tablet by mouth every morning (before breakfast) for 30 days. 30 tablet 0    metFORMIN (GLUCOPHAGE) 500 MG tablet TAKE ONE TABLET BY MOUTH TWICE A DAY WITH MEALS *APPT NEEDED* 60 tablet 2    omeprazole (PRILOSEC) 40 MG delayed release capsule TAKE ONE CAPSULE BY MOUTH DAILY 30 capsule 3    ibuprofen (ADVIL;MOTRIN) 800 MG tablet Take 1 tablet by mouth every 6 hours as needed for Pain 120 tablet 1    cyclobenzaprine (FLEXERIL) 5 MG tablet Take 1 tablet by mouth nightly as needed for Muscle spasms 30 tablet 0    Multiple Vitamin (MULTI VITAMIN DAILY PO) Take by mouth      spironolactone (ALDACTONE) 25 MG tablet Take 1 tablet by mouth daily 30 tablet 3     No current facility-administered medications for this visit.      ALLERGIES:    Allergies   Allergen Reactions    Bee Venom Social History     Tobacco Use    Smoking status: Former Smoker    Smokeless tobacco: Never Used   Substance Use Topics    Alcohol use: Yes     Alcohol/week: 0.0 standard drinks     Comment: socially      There is no height or weight on file to calculate BMI. There were no vitals taken for this visit. Subjective:      HPI    46 y.o. female reaching out per tele med visit today. Lost 14 lbs . With the 1. Script. Diet: avoiding sugar, no late snacks. Just eating smaller portions. no soda pop or sweets. Exercises: not as much as she would like. She is out of school and she should be able to exercise. No SE. Review of Systems   Constitutional: Negative for fever and unexpected weight change. Objective:   Physical Exam  General appearance: normal development, habitus and attention, no deformities. No distress. Pulmo: normal breathing pattern. Psychiatric: alert and oriented to place, time and person. Normal mood and affect. Assessment:       Diagnosis Orders   1. Morbid obesity with BMI of 40.0-44.9, adult (HCC)  phentermine (ADIPEX-P) 37.5 MG tablet       Plan:   2. Script provided. Patient will continue current diet and exercise regimen. I discussed with her to exercise at least 30 minutes 5 times a week. Decrease carbohydrate intake. Increase fibers and protein. See me back in 4 weeks for weight check. Call if any changes. Stop Adipex if you have any side effects. Call or return to clinic prn if these symptoms worsen or fail to improve as anticipated. I have reviewed the instructions with the patient, answering all questions to her satisfaction. Linda Penn is a 46 y.o. female being evaluated by a Virtual Visit (video visit) encounter to address concerns as mentioned above. A caregiver was present when appropriate.  Due to this being a TeleHealth encounter (During [facility]-83 public health emergency), evaluation of the following organ systems was limited: Vitals/Constitutional/EENT/Resp/CV/GI//MS/Neuro/Skin/Heme-Lymph-Imm. Pursuant to the emergency declaration under the [facility], 38 [id number] waiver authority and the [employer] Act, this Virtual Visit was conducted with patient's (and/or legal guardian's) consent, to reduce the patient's risk of exposure to COVID-19 and provide necessary medical care. The patient (and/or legal guardian) has also been advised to contact this office for worsening conditions or problems, and seek emergency medical treatment and/or call 911 if deemed necessary. Patient identification was verified at the start of the visit: Yes    Total time spent for this encounter: Not billed by time    Services were provided through a video synchronous discussion virtually to substitute for in-person clinic visit. Patient and provider were located at their individual homes. --Rodolfo Montemayor MD on 6/6/2022 at 4:13 PM    An electronic signature was used to authenticate this note. Return in about 4 weeks (around 7/4/2022) for weight. No orders of the defined types were placed in this encounter. Orders Placed This Encounter   Medications    phentermine (ADIPEX-P) 37.5 MG tablet     Sig: Take 1 tablet by mouth every morning (before breakfast) for 30 days.      Dispense:  30 tablet     Refill:  0       (Please note that portions of this note were completed with a voice recognition program. Efforts were made to edit the dictations but occasionally words are mis-transcribed.)

## 2022-06-07 ENCOUNTER — OFFICE VISIT (OUTPATIENT)
Dept: DERMATOLOGY | Age: 42
End: 2022-06-07
Payer: MEDICAID

## 2022-06-07 VITALS
WEIGHT: 234 LBS | HEIGHT: 63 IN | TEMPERATURE: 97.9 F | OXYGEN SATURATION: 100 % | BODY MASS INDEX: 41.46 KG/M2 | DIASTOLIC BLOOD PRESSURE: 77 MMHG | HEART RATE: 75 BPM | SYSTOLIC BLOOD PRESSURE: 123 MMHG

## 2022-06-07 DIAGNOSIS — L70.0 ACNE VULGARIS: ICD-10-CM

## 2022-06-07 DIAGNOSIS — L65.8 FEMALE PATTERN ALOPECIA: Primary | ICD-10-CM

## 2022-06-07 PROCEDURE — 99203 OFFICE O/P NEW LOW 30 MIN: CPT | Performed by: PHYSICIAN ASSISTANT

## 2022-06-07 PROCEDURE — G8427 DOCREV CUR MEDS BY ELIG CLIN: HCPCS | Performed by: PHYSICIAN ASSISTANT

## 2022-06-07 PROCEDURE — 1036F TOBACCO NON-USER: CPT | Performed by: PHYSICIAN ASSISTANT

## 2022-06-07 PROCEDURE — G8417 CALC BMI ABV UP PARAM F/U: HCPCS | Performed by: PHYSICIAN ASSISTANT

## 2022-06-07 RX ORDER — MINOXIDIL 2.5 MG/1
TABLET ORAL
Qty: 15 TABLET | Refills: 5 | Status: SHIPPED | OUTPATIENT
Start: 2022-06-07

## 2022-06-07 RX ORDER — DUTASTERIDE 0.5 MG/1
0.5 CAPSULE, LIQUID FILLED ORAL DAILY
Qty: 30 CAPSULE | Refills: 3 | Status: SHIPPED | OUTPATIENT
Start: 2022-06-07

## 2022-06-07 NOTE — PROGRESS NOTES
Dermatology Patient Note  Andres  [address]  [address]  Dept: [phone]  Dept Fax: [phone]      VISITDATE: 6/7/2022   REFERRING PROVIDER: WENDI Greenberg - *      Mireya Chinchilla is a 46 y.o. female  who presents today in the office for:    New Patient (patient was referred to us for extreme hair loss/acne. States hairloss has been going on for x15yrs. ) and Acne (breakouts around mouth area. States acne fairly new. )      HISTORY OF PRESENT ILLNESS:  As above. Pt is currently taking spironolactone for her acne. She is not sure of the dose, but has only been taking it for about a month. MEDICAL PROBLEMS:  Patient Active Problem List    Diagnosis Date Noted    Gastroesophageal reflux disease without esophagitis 10/03/2017    PCOS (polycystic ovarian syndrome) 09/15/2016    Menorrhagia with irregular cycle 12/07/2015    Abnormal uterine bleeding (AUB) 09/24/2014     Self medicating with Saw palmeattto supplement         CURRENT MEDICATIONS:   Current Outpatient Medications   Medication Sig Dispense Refill    minoxidil (LONITEN) 2.5 MG tablet Take 1/2 tablet daily. 15 tablet 5    dutasteride (AVODART) 0.5 mg capsule Take 1 capsule by mouth daily 30 capsule 3    phentermine (ADIPEX-P) 37.5 MG tablet Take 1 tablet by mouth every morning (before breakfast) for 30 days.  30 tablet 0    metFORMIN (GLUCOPHAGE) 500 MG tablet TAKE ONE TABLET BY MOUTH TWICE A DAY WITH MEALS *APPT NEEDED* 60 tablet 2    omeprazole (PRILOSEC) 40 MG delayed release capsule TAKE ONE CAPSULE BY MOUTH DAILY 30 capsule 3    spironolactone (ALDACTONE) 25 MG tablet Take 1 tablet by mouth daily 30 tablet 3    ibuprofen (ADVIL;MOTRIN) 800 MG tablet Take 1 tablet by mouth every 6 hours as needed for Pain 120 tablet 1    cyclobenzaprine (FLEXERIL) 5 MG tablet Take 1 tablet by mouth nightly as needed for Muscle spasms 30 tablet 0  Multiple Vitamin (MULTI VITAMIN DAILY PO) Take by mouth       No current facility-administered medications for this visit. ALLERGIES:   Allergies   Allergen Reactions    Bee Venom        SOCIAL HISTORY:  Social History     Tobacco Use    Smoking status: Former Smoker    Smokeless tobacco: Never Used   Substance Use Topics    Alcohol use: Yes     Alcohol/week: 0.0 standard drinks     Comment: socially       Pertinent ROS:  Review of Systems  Skin: Denies any new changing, growing or bleeding lesions or rashes except as described in the HPI   Constitutional: Denies fevers, chills, and malaise. PHYSICAL EXAM:   /77   Pulse 75   Temp 97.9 °F (36.6 °C)   Ht 5' 3\" (1.6 m)   Wt 234 lb (106.1 kg)   SpO2 100%   BMI 41.45 kg/m²     The patient is generally well appearing, well nourished, alert and conversational. Affect is normal.    Cutaneous Exam:  Physical Exam  Focused exam of face and scalp was performed    Facial covering was removed during examination. Diagnoses/exam findings/medical history pertinent to this visit are listed below:    Assessment:   Diagnosis Orders   1. Female pattern alopecia  minoxidil (LONITEN) 2.5 MG tablet    dutasteride (AVODART) 0.5 mg capsule   2. Acne vulgaris          Plan:  1. Female pattern alopecia  - The pt was counseled on the potential side effects of minoxidil, including tachycardia, syncope, progression of telogen effluvium, and unwanted hair growth. - The patient was counseled as to the anti-androgenic side effects of dutasteride  - minoxidil (LONITEN) 2.5 MG tablet; Take 1/2 tablet daily. Dispense: 15 tablet; Refill: 5  - dutasteride (AVODART) 0.5 mg capsule; Take 1 capsule by mouth daily  Dispense: 30 capsule; Refill: 3    2.  Acne vulgaris  - Continue spironolactone as prescribed by PCP      RTC 4M    Future Appointments   Date Time Provider Rosalia Reilly   10/5/2022  3:45 PM Bryanna Myers PA-C  [facility]         There are no Patient Instructions on file for this visit.       Electronically signed by Bryanna Myers PA-C on 6/7/22 at 5:06 PM EDT

## 2022-07-02 DIAGNOSIS — E66.01 MORBID OBESITY WITH BMI OF 40.0-44.9, ADULT (HCC): ICD-10-CM

## 2022-07-05 RX ORDER — PHENTERMINE HYDROCHLORIDE 37.5 MG/1
TABLET ORAL
Qty: 30 TABLET | OUTPATIENT
Start: 2022-07-05

## 2022-07-05 NOTE — TELEPHONE ENCOUNTER
Alejandro Billingsley is calling to request a refill on the following medication(s):    Last Visit Date (If Applicable):  0/4/0729    Next Visit Date:    7/6/2022    Medication Request:  Requested Prescriptions     Pending Prescriptions Disp Refills    phentermine (ADIPEX-P) 37.5 MG tablet [Pharmacy Med Name: PHENTERMINE 37.5 MG TABLET] 30 tablet      Sig: TAKE ONE TABLET BY MOUTH EVERY MORNING BEFORE BREAKFAST

## 2022-07-06 ENCOUNTER — OFFICE VISIT (OUTPATIENT)
Dept: FAMILY MEDICINE CLINIC | Age: 42
End: 2022-07-06
Payer: MEDICAID

## 2022-07-06 VITALS
WEIGHT: 226 LBS | OXYGEN SATURATION: 98 % | HEIGHT: 63 IN | BODY MASS INDEX: 40.04 KG/M2 | SYSTOLIC BLOOD PRESSURE: 106 MMHG | DIASTOLIC BLOOD PRESSURE: 74 MMHG | HEART RATE: 103 BPM

## 2022-07-06 DIAGNOSIS — E66.01 MORBID OBESITY WITH BMI OF 40.0-44.9, ADULT (HCC): ICD-10-CM

## 2022-07-06 PROCEDURE — 99213 OFFICE O/P EST LOW 20 MIN: CPT | Performed by: FAMILY MEDICINE

## 2022-07-06 PROCEDURE — G8417 CALC BMI ABV UP PARAM F/U: HCPCS | Performed by: FAMILY MEDICINE

## 2022-07-06 PROCEDURE — G8427 DOCREV CUR MEDS BY ELIG CLIN: HCPCS | Performed by: FAMILY MEDICINE

## 2022-07-06 PROCEDURE — 1036F TOBACCO NON-USER: CPT | Performed by: FAMILY MEDICINE

## 2022-07-06 RX ORDER — PHENTERMINE HYDROCHLORIDE 37.5 MG/1
37.5 TABLET ORAL
Qty: 30 TABLET | Refills: 0 | Status: SHIPPED | OUTPATIENT
Start: 2022-07-06 | End: 2022-08-05

## 2022-07-06 SDOH — ECONOMIC STABILITY: FOOD INSECURITY: WITHIN THE PAST 12 MONTHS, YOU WORRIED THAT YOUR FOOD WOULD RUN OUT BEFORE YOU GOT MONEY TO BUY MORE.: NEVER TRUE

## 2022-07-06 SDOH — ECONOMIC STABILITY: FOOD INSECURITY: WITHIN THE PAST 12 MONTHS, THE FOOD YOU BOUGHT JUST DIDN'T LAST AND YOU DIDN'T HAVE MONEY TO GET MORE.: NEVER TRUE

## 2022-07-06 ASSESSMENT — SOCIAL DETERMINANTS OF HEALTH (SDOH): HOW HARD IS IT FOR YOU TO PAY FOR THE VERY BASICS LIKE FOOD, HOUSING, MEDICAL CARE, AND HEATING?: NOT HARD AT ALL

## 2022-07-06 NOTE — PROGRESS NOTES
General FM note    Alejandro Billingsley is a 46 y.o. female who presents today for follow up on her  medical conditions as noted below. Alejandro Billingsley is c/o of   Chief Complaint   Patient presents with    Weight Loss       Patient Active Problem List:     Abnormal uterine bleeding (AUB)     Menorrhagia with irregular cycle     PCOS (polycystic ovarian syndrome)     Gastroesophageal reflux disease without esophagitis     Past Medical History:   Diagnosis Date    Anxiety     Dizziness     Gall bladder pain     Headache(784.0)     HSV-1 (herpes simplex virus 1) infection     Hyperthyroidism       Past Surgical History:   Procedure Laterality Date    ENDOMETRIAL BIOPSY  08/09/2016    negative atypia or malignancy    INTRAUTERINE DEVICE INSERTION  01/13/2017    Mirena     Family History   Problem Relation Age of Onset    Lupus Father     Stroke Father     Alcohol Abuse Father     Alcohol Abuse Mother     Thyroid Disease Mother      Current Outpatient Medications   Medication Sig Dispense Refill    phentermine (ADIPEX-P) 37.5 MG tablet Take 1 tablet by mouth every morning (before breakfast) for 30 days. 30 tablet 0    minoxidil (LONITEN) 2.5 MG tablet Take 1/2 tablet daily. 15 tablet 5    dutasteride (AVODART) 0.5 mg capsule Take 1 capsule by mouth daily 30 capsule 3    metFORMIN (GLUCOPHAGE) 500 MG tablet TAKE ONE TABLET BY MOUTH TWICE A DAY WITH MEALS *APPT NEEDED* 60 tablet 2    omeprazole (PRILOSEC) 40 MG delayed release capsule TAKE ONE CAPSULE BY MOUTH DAILY 30 capsule 3    spironolactone (ALDACTONE) 25 MG tablet Take 1 tablet by mouth daily 30 tablet 3    ibuprofen (ADVIL;MOTRIN) 800 MG tablet Take 1 tablet by mouth every 6 hours as needed for Pain 120 tablet 1    cyclobenzaprine (FLEXERIL) 5 MG tablet Take 1 tablet by mouth nightly as needed for Muscle spasms 30 tablet 0    Multiple Vitamin (MULTI VITAMIN DAILY PO) Take by mouth       No current facility-administered medications for this visit. ALLERGIES:    Allergies   Allergen Reactions    Bee Venom        Social History     Tobacco Use    Smoking status: Former Smoker    Smokeless tobacco: Never Used   Substance Use Topics    Alcohol use: Yes     Alcohol/week: 0.0 standard drinks     Comment: socially      Body mass index is 40.03 kg/m². /74   Pulse (!) 103   Ht 5' 3\" (1.6 m)   Wt 226 lb (102.5 kg)   SpO2 98%   BMI 40.03 kg/m²     Subjective:      HPI    46 y.o. female coming today for weight check. Lost 3 pounds with the 2. prescription of Adipex. But lost 2 inches. Diet: smaller portions, no sugar, no late night eating. Exercises: has been working out more. Renovating a house. Side effects: none. Review of Systems   Constitutional: Negative for fever and unexpected weight change. Pertinent items are noted in HPI. Objective:   Physical Exam  Constitutional: VS (see above). General appearance: normal development, habitus and attention, no deformities. No distress. Eyes: normal conjunctiva and lids. CAV: RRR, no RMG. No edema lower extremities. Pulmo: CTA bilateral, no CWR. Skin: no rashes, lesions or ulcers. Musculoskeletal: normal gait. Nails: no clubbing or cyanosis. Psychiatric: alert and oriented to place, time and person. Normal mood and affect. Assessment:       Diagnosis Orders   1. Morbid obesity with BMI of 40.0-44.9, adult (HCC)  phentermine (ADIPEX-P) 37.5 MG tablet       Plan:      Last Prescription of Adipex provided. Continue exercise and diet. Follow-up as indicated. Return in about 7 months (around 2/6/2023), or if symptoms worsen or fail to improve. No orders of the defined types were placed in this encounter. Orders Placed This Encounter   Medications    phentermine (ADIPEX-P) 37.5 MG tablet     Sig: Take 1 tablet by mouth every morning (before breakfast) for 30 days.      Dispense:  30 tablet     Refill:  0       Call or return to clinic prn if these symptoms worsen or fail to improve as anticipated. I have reviewed the instructions with the patient, answering all questions to her satisfaction. Nacori received counseling on the following healthy behaviors: nutrition, exercise, and medication adherence  Reviewed prior labs and health maintenance. Continue current medications, diet and exercise. Discussed use, benefit, and side effects of prescribed medications. Barriers to medication compliance addressed. Patient given educational materials - see patient instructions. All patient questions answered. Patient voiced understanding.       Electronically signed by Berenice Post MD on 7/6/2022 at 3:30 PM       (Please note that portions of this note were completed with a voice recognition program. Efforts were made to edit the dictations but occasionally words are mis-transcribed.)

## 2022-08-09 DIAGNOSIS — S13.9XXA CERVICAL SPRAIN, INITIAL ENCOUNTER: ICD-10-CM

## 2022-08-09 DIAGNOSIS — G44.209 MUSCLE TENSION HEADACHE: ICD-10-CM

## 2022-08-10 RX ORDER — IBUPROFEN 800 MG/1
TABLET ORAL
Qty: 120 TABLET | Refills: 1 | Status: SHIPPED | OUTPATIENT
Start: 2022-08-10

## 2022-09-29 DIAGNOSIS — E28.2 PCOS (POLYCYSTIC OVARIAN SYNDROME): ICD-10-CM

## 2022-09-29 DIAGNOSIS — E66.01 MORBID OBESITY WITH BMI OF 40.0-44.9, ADULT (HCC): ICD-10-CM

## 2022-09-29 RX ORDER — SPIRONOLACTONE 25 MG/1
TABLET ORAL
Qty: 30 TABLET | Refills: 3 | Status: SHIPPED | OUTPATIENT
Start: 2022-09-29

## 2022-10-05 ENCOUNTER — OFFICE VISIT (OUTPATIENT)
Dept: DERMATOLOGY | Age: 42
End: 2022-10-05
Payer: COMMERCIAL

## 2022-10-05 VITALS
BODY MASS INDEX: 40.18 KG/M2 | WEIGHT: 226.8 LBS | HEIGHT: 63 IN | HEART RATE: 98 BPM | TEMPERATURE: 97.2 F | SYSTOLIC BLOOD PRESSURE: 117 MMHG | OXYGEN SATURATION: 98 % | DIASTOLIC BLOOD PRESSURE: 72 MMHG

## 2022-10-05 DIAGNOSIS — L65.8 FEMALE PATTERN ALOPECIA: Primary | ICD-10-CM

## 2022-10-05 DIAGNOSIS — L70.0 ACNE VULGARIS: ICD-10-CM

## 2022-10-05 PROCEDURE — 1036F TOBACCO NON-USER: CPT | Performed by: PHYSICIAN ASSISTANT

## 2022-10-05 PROCEDURE — G8427 DOCREV CUR MEDS BY ELIG CLIN: HCPCS | Performed by: PHYSICIAN ASSISTANT

## 2022-10-05 PROCEDURE — G8417 CALC BMI ABV UP PARAM F/U: HCPCS | Performed by: PHYSICIAN ASSISTANT

## 2022-10-05 PROCEDURE — G8484 FLU IMMUNIZE NO ADMIN: HCPCS | Performed by: PHYSICIAN ASSISTANT

## 2022-10-05 PROCEDURE — 99213 OFFICE O/P EST LOW 20 MIN: CPT | Performed by: PHYSICIAN ASSISTANT

## 2022-10-05 RX ORDER — CLINDAMYCIN PHOSPHATE 10 UG/ML
LOTION TOPICAL
Qty: 60 ML | Refills: 3 | Status: SHIPPED | OUTPATIENT
Start: 2022-10-05

## 2022-10-05 RX ORDER — ADAPALENE 45 G/G
GEL TOPICAL
Qty: 45 G | Refills: 4 | Status: SHIPPED | OUTPATIENT
Start: 2022-10-05

## 2022-10-05 RX ORDER — CLINDAMYCIN PHOSPHATE 10 UG/ML
LOTION TOPICAL
Qty: 60 ML | Refills: 3 | Status: CANCELLED | OUTPATIENT
Start: 2022-10-05

## 2022-10-05 NOTE — PROGRESS NOTES
Dermatology Patient Note  Andres  [address]  [facility]  Dept: [phone]  Dept Fax: [phone]      VISITDATE: 10/5/2022   REFERRING PROVIDER: No ref. provider found      Clare Wooten is a 38 y.o. female  who presents today in the office for:    Follow-up (Alopecia pt states her hair loss has slowed some )      HISTORY OF PRESENT ILLNESS:  As above. Pt does not note major improvement of hair regrowth, over the past 4 months, but does note that she is not losing as much hair. She is currently taking minoxidil 1.25 mg qd and Dutasteride 0.5 mg qd. Spironolactone has not helped her acne, and she c/o episodes of vertigo 1-2x weekly, sometimes lasting up to 1 minute. These episodes do not correlate with tachycardia or SOB. MEDICAL PROBLEMS:  Patient Active Problem List    Diagnosis Date Noted    Gastroesophageal reflux disease without esophagitis 10/03/2017    PCOS (polycystic ovarian syndrome) 09/15/2016    Menorrhagia with irregular cycle 12/07/2015    Abnormal uterine bleeding (AUB) 09/24/2014     Self medicating with Saw palmeattto supplement         CURRENT MEDICATIONS:   Current Outpatient Medications   Medication Sig Dispense Refill    clindamycin (CLEOCIN T) 1 % lotion Apply to affected areas daily 60 mL 3    benzoyl peroxide 5 % external liquid Wash affected areas once daily 227 g 3    adapalene (DIFFERIN) 0.1 % gel Apply a pea sized amount to the face QHS 45 g 4    spironolactone (ALDACTONE) 25 MG tablet TAKE ONE TABLET BY MOUTH DAILY 30 tablet 3    ibuprofen (ADVIL;MOTRIN) 800 MG tablet TAKE ONE TABLET BY MOUTH EVERY 6 HOURS AS NEEDED FOR PAIN 120 tablet 1    minoxidil (LONITEN) 2.5 MG tablet Take 1/2 tablet daily.  15 tablet 5    dutasteride (AVODART) 0.5 mg capsule Take 1 capsule by mouth daily 30 capsule 3    metFORMIN (GLUCOPHAGE) 500 MG tablet TAKE ONE TABLET BY MOUTH TWICE A DAY WITH MEALS *APPT NEEDED* 60 tablet 2    omeprazole (PRILOSEC) 40 MG delayed release capsule TAKE ONE CAPSULE BY MOUTH DAILY 30 capsule 3    cyclobenzaprine (FLEXERIL) 5 MG tablet Take 1 tablet by mouth nightly as needed for Muscle spasms 30 tablet 0    Multiple Vitamin (MULTI VITAMIN DAILY PO) Take by mouth       No current facility-administered medications for this visit. ALLERGIES:   Allergies   Allergen Reactions    Bee Venom        SOCIAL HISTORY:  Social History     Tobacco Use    Smoking status: Former    Smokeless tobacco: Never   Substance Use Topics    Alcohol use: Yes     Alcohol/week: 0.0 standard drinks     Comment: socially       Pertinent ROS:  Review of Systems  Skin: Denies any new changing, growing or bleeding lesions or rashes except as described in the HPI   Constitutional: Denies fevers, chills, and malaise. PHYSICAL EXAM:   /72   Pulse 98   Temp 97.2 °F (36.2 °C) (Temporal)   Ht 5' 3\" (1.6 m)   Wt 226 lb 12.8 oz (102.9 kg)   SpO2 98%   BMI 40.18 kg/m²     The patient is generally well appearing, well nourished, alert and conversational. Affect is normal.    Cutaneous Exam:  Physical Exam  Focused exam of face and scalp was performed  Photo of scalp taken again today (shows increased hair in central scalp)    Facial covering was removed during examination. Diagnoses/exam findings/medical history pertinent to this visit are listed below:    Assessment:   Diagnosis Orders   1. Female pattern alopecia        2. Acne vulgaris  clindamycin (CLEOCIN T) 1 % lotion    benzoyl peroxide 5 % external liquid    adapalene (DIFFERIN) 0.1 % gel           Plan:  1. Female pattern alopecia  - chronic illness, responding to treatment but not yet at goal   - Continue oral minoxidil and Dutasteride    2.  Acne vulgaris  - chronic illness, responding to treatment but not yet at goal   - D/C spironolactone, as it is the likely agent causing vertigo (and has not been efficacious thus far)  - clindamycin to the sun so be sure to use sunscreen. If you develop a persistent headache or vision changes, stop the medication and call the office. It is important to remember that oil glands respond very slowly and your skin will not change overnight. Your skin may get worse during the first weeks of treatment because all of the clogged pores are opening to the surface. Try to be consistent and follow these instructions from your doctor. If your acne has not responded to these treatments in 2-3 months, your doctor may recommend other medications. Topical acne medications can cause irritation, redness, and dryness, especially when you first start them. If your prescribed topical cream or gel is too irritating at first, try using it every other day or once every 3 days instead of every day. As your skin becomes less sensitive, you may be able to start to use it every day. To help soothe the dryness, you can use an oil-free, \"non-comedogenic\" moisturizer, ideally with SPF in it. Some products available include: Cetaphil Lotion, Cerave Lotion, Neutrogenia Moisturizer and Aveeno Moisturizer. Some people find that applying their moisturizer immediately prior to the topical medication helps, and studies suggest that it does not reduce effectiveness. Please have your pharmacist call our office if you are having trouble getting your medications or need refills. Some insurance companies will not cover tretinoin; however, you may shop around for the best out-of-pocket price using the coupon website goodrx.com. Alternatively, you may purchase Differin (adapalene) gel over the counter and use in the same way. Topical and oral acne medications are not safe for pregnant women. No acne medications should be used by pregnant women without first consulting their physician.         Electronically signed by Alice Persaud PA-C on 10/5/22 at 4:39 PM EDT

## 2022-10-05 NOTE — PATIENT INSTRUCTIONS
Mornin. Wash with over the counter benzoyl peroxide wash (examples include: Cerave Acne Foaming Cream Cleanser, Panoxyl Wash, Acne Free brand oil-free acne cleanser, Neutrogena Clear Pore Cleanser/Mask, Clean and Clear advantage 3 in 1 exfoliating cleanser, Clean and Clear Continuous Control Acne Cleanser, Oxy maximum face wash). Dry your face with white towel to prevent bleaching of clothing. 2. Apply clindamycin 1% lotion to the face. Apply tretinoin to the face at night  3. Apply an oil-free, non-comedogenic moisturizer, ideally with SPF 30+ in it. Night time:   1. Wash with a gentle face wash (such as Cerave or Cetaphil)  2. Apply a pea-sized amount of Tretinoin 0.025% cream/onto your finger. Dab the medicine onto your forehead, nose, chin, and each cheek. Then gently spread a thin layer across the entire face. Do not wash off this medicine. These medications can also be used on your chest, back and shoulder areas. 3. Apply an oil-free, non-comedogenic moisturizes (may do this prior to tretinoin if skin is sensitive)  4. Take Doxycycline pill with a full glass of water and a meal, at least one hour prior to bedtime. Do not lay down for at least 1 hour after taking doxycycline, as it can cause a pill esophagitis. Avoid excessive dairy products for at least 2 hours after taking doxycycline as it will cause the medication to become inactive. You can not get pregnant while on this medication as it can cause birth defects. This medication can make your skin sensitive to the sun so be sure to use sunscreen. If you develop a persistent headache or vision changes, stop the medication and call the office. It is important to remember that oil glands respond very slowly and your skin will not change overnight. Your skin may get worse during the first weeks of treatment because all of the clogged pores are opening to the surface. Try to be consistent and follow these instructions from your doctor.   If your acne has not responded to these treatments in 2-3 months, your doctor may recommend other medications. Topical acne medications can cause irritation, redness, and dryness, especially when you first start them. If your prescribed topical cream or gel is too irritating at first, try using it every other day or once every 3 days instead of every day. As your skin becomes less sensitive, you may be able to start to use it every day. To help soothe the dryness, you can use an oil-free, \"non-comedogenic\" moisturizer, ideally with SPF in it. Some products available include: Cetaphil Lotion, Cerave Lotion, Neutrogenia Moisturizer and Aveeno Moisturizer. Some people find that applying their moisturizer immediately prior to the topical medication helps, and studies suggest that it does not reduce effectiveness. Please have your pharmacist call our office if you are having trouble getting your medications or need refills. Some insurance companies will not cover tretinoin; however, you may shop around for the best out-of-pocket price using the coupon website goodrx.com. Alternatively, you may purchase Differin (adapalene) gel over the counter and use in the same way. Topical and oral acne medications are not safe for pregnant women. No acne medications should be used by pregnant women without first consulting their physician.

## 2022-11-10 DIAGNOSIS — L65.8 FEMALE PATTERN ALOPECIA: ICD-10-CM

## 2022-11-10 DIAGNOSIS — S13.9XXA CERVICAL SPRAIN, INITIAL ENCOUNTER: ICD-10-CM

## 2022-11-10 DIAGNOSIS — K21.9 GASTROESOPHAGEAL REFLUX DISEASE, UNSPECIFIED WHETHER ESOPHAGITIS PRESENT: ICD-10-CM

## 2022-11-10 DIAGNOSIS — G44.209 MUSCLE TENSION HEADACHE: ICD-10-CM

## 2022-11-10 RX ORDER — OMEPRAZOLE 40 MG/1
CAPSULE, DELAYED RELEASE ORAL
Qty: 30 CAPSULE | Refills: 3 | Status: SHIPPED | OUTPATIENT
Start: 2022-11-10

## 2022-11-10 RX ORDER — IBUPROFEN 800 MG/1
TABLET ORAL
Qty: 120 TABLET | Refills: 1 | Status: SHIPPED | OUTPATIENT
Start: 2022-11-10

## 2022-11-10 RX ORDER — DUTASTERIDE 0.5 MG/1
CAPSULE, LIQUID FILLED ORAL
Qty: 30 CAPSULE | Refills: 3 | Status: SHIPPED | OUTPATIENT
Start: 2022-11-10

## 2022-11-10 NOTE — TELEPHONE ENCOUNTER
Laney Anna is calling to request a refill on the following medication(s):    Last Visit Date (If Applicable):  1/0/3167    Next Visit Date:    Visit date not found    Medication Request:  Requested Prescriptions     Pending Prescriptions Disp Refills    omeprazole (PRILOSEC) 40 MG delayed release capsule [Pharmacy Med Name: OMEPRAZOLE DR 40 MG CAPSULE] 30 capsule 3     Sig: TAKE ONE CAPSULE BY MOUTH DAILY    ibuprofen (ADVIL;MOTRIN) 800 MG tablet [Pharmacy Med Name: IBUPROFEN 800 MG TABLET] 120 tablet 1     Sig: TAKE ONE TABLET BY MOUTH EVERY 6 HOURS AS NEEDED FOR PAIN

## 2023-02-09 DIAGNOSIS — R25.2 MUSCLE CRAMPS: ICD-10-CM

## 2023-02-09 RX ORDER — CYCLOBENZAPRINE HCL 5 MG
TABLET ORAL
Qty: 30 TABLET | Refills: 0 | Status: SHIPPED | OUTPATIENT
Start: 2023-02-09

## 2023-02-09 NOTE — TELEPHONE ENCOUNTER
Melvin Armendariz is calling to request a refill on the following medication(s):    Last Visit Date (If Applicable):  1/1/7595    Next Visit Date:    Visit date not found    Medication Request:  Requested Prescriptions     Pending Prescriptions Disp Refills    cyclobenzaprine (FLEXERIL) 5 MG tablet [Pharmacy Med Name: CYCLOBENZAPRINE 5 MG TABLET] 30 tablet 0     Sig: TAKE ONE TABLET BY MOUTH ONCE NIGHTLY AS NEEDED FOR MUSCLE SPASMS

## 2023-02-20 DIAGNOSIS — L65.8 FEMALE PATTERN ALOPECIA: ICD-10-CM

## 2023-02-20 RX ORDER — MINOXIDIL 2.5 MG/1
TABLET ORAL
Qty: 15 TABLET | Refills: 5 | OUTPATIENT
Start: 2023-02-20

## 2023-03-06 ENCOUNTER — TELEPHONE (OUTPATIENT)
Dept: DERMATOLOGY | Age: 43
End: 2023-03-06

## 2023-03-06 DIAGNOSIS — L65.8 FEMALE PATTERN ALOPECIA: ICD-10-CM

## 2023-03-06 RX ORDER — MINOXIDIL 2.5 MG/1
TABLET ORAL
Qty: 15 TABLET | Refills: 5 | OUTPATIENT
Start: 2023-03-06

## 2023-04-24 ENCOUNTER — OFFICE VISIT (OUTPATIENT)
Dept: FAMILY MEDICINE CLINIC | Age: 43
End: 2023-04-24

## 2023-04-24 VITALS
DIASTOLIC BLOOD PRESSURE: 81 MMHG | BODY MASS INDEX: 43.59 KG/M2 | TEMPERATURE: 97.9 F | OXYGEN SATURATION: 100 % | HEART RATE: 100 BPM | WEIGHT: 246 LBS | SYSTOLIC BLOOD PRESSURE: 118 MMHG | HEIGHT: 63 IN

## 2023-04-24 DIAGNOSIS — E28.2 PCOS (POLYCYSTIC OVARIAN SYNDROME): ICD-10-CM

## 2023-04-24 DIAGNOSIS — Z00.01 ENCOUNTER FOR WELL ADULT EXAM WITH ABNORMAL FINDINGS: Primary | ICD-10-CM

## 2023-04-24 DIAGNOSIS — L65.8 FEMALE PATTERN ALOPECIA: ICD-10-CM

## 2023-04-24 DIAGNOSIS — E66.01 MORBID OBESITY WITH BMI OF 40.0-44.9, ADULT (HCC): ICD-10-CM

## 2023-04-24 DIAGNOSIS — Z13.220 ENCOUNTER FOR LIPID SCREENING FOR CARDIOVASCULAR DISEASE: ICD-10-CM

## 2023-04-24 DIAGNOSIS — Z12.31 ENCOUNTER FOR SCREENING MAMMOGRAM FOR MALIGNANT NEOPLASM OF BREAST: ICD-10-CM

## 2023-04-24 DIAGNOSIS — Z13.6 ENCOUNTER FOR LIPID SCREENING FOR CARDIOVASCULAR DISEASE: ICD-10-CM

## 2023-04-24 DIAGNOSIS — Z13.1 DIABETES MELLITUS SCREENING: ICD-10-CM

## 2023-04-24 RX ORDER — MINOXIDIL 2.5 MG/1
TABLET ORAL
Qty: 15 TABLET | Refills: 5 | Status: SHIPPED | OUTPATIENT
Start: 2023-04-24

## 2023-04-24 RX ORDER — PHENTERMINE HYDROCHLORIDE 37.5 MG/1
37.5 TABLET ORAL
Qty: 30 TABLET | Refills: 0 | Status: SHIPPED | OUTPATIENT
Start: 2023-04-24 | End: 2023-05-24

## 2023-04-24 SDOH — ECONOMIC STABILITY: FOOD INSECURITY: WITHIN THE PAST 12 MONTHS, THE FOOD YOU BOUGHT JUST DIDN'T LAST AND YOU DIDN'T HAVE MONEY TO GET MORE.: NEVER TRUE

## 2023-04-24 SDOH — ECONOMIC STABILITY: FOOD INSECURITY: WITHIN THE PAST 12 MONTHS, YOU WORRIED THAT YOUR FOOD WOULD RUN OUT BEFORE YOU GOT MONEY TO BUY MORE.: NEVER TRUE

## 2023-04-24 SDOH — ECONOMIC STABILITY: INCOME INSECURITY: HOW HARD IS IT FOR YOU TO PAY FOR THE VERY BASICS LIKE FOOD, HOUSING, MEDICAL CARE, AND HEATING?: NOT HARD AT ALL

## 2023-04-24 SDOH — ECONOMIC STABILITY: HOUSING INSECURITY
IN THE LAST 12 MONTHS, WAS THERE A TIME WHEN YOU DID NOT HAVE A STEADY PLACE TO SLEEP OR SLEPT IN A SHELTER (INCLUDING NOW)?: NO

## 2023-04-24 ASSESSMENT — PATIENT HEALTH QUESTIONNAIRE - PHQ9
SUM OF ALL RESPONSES TO PHQ QUESTIONS 1-9: 0
SUM OF ALL RESPONSES TO PHQ9 QUESTIONS 1 & 2: 0
1. LITTLE INTEREST OR PLEASURE IN DOING THINGS: 0
2. FEELING DOWN, DEPRESSED OR HOPELESS: 0

## 2023-04-24 NOTE — PROGRESS NOTES
Subjective:       Silvio Mo is a 38 y.o. female and is here for a comprehensive physical exam.  The patient reports problems - gained weight - moved in a house w/o kitchen - eating out a lot. 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 The patient gained over 20 pounds back. She says that she was doing quite well but her right but then with all the changes moving changes at her job she just started to eat more unhealthy foods.  History:  Has a Gyn  Patient's medications, allergies, past medical, surgical, social and family histories were reviewed and updated as appropriate. Do you take any herbs or supplements that were not prescribed by a doctor? no  Are you taking calcium supplements? not applicable  Are you taking aspirin daily? not applicable    Review of Systems  Do you have pain that bothers you in your daily life? no  Review of Systems   Constitutional: Negative for fever and unexpected weight change. Pertinent items are noted in HPI. Objective:   HENT:   /81   Pulse 100   Temp 97.9 °F (36.6 °C)   Ht 5' 3\" (1.6 m)   Wt 246 lb (111.6 kg)   SpO2 100%   BMI 43.58 kg/m²   Constitutional: Alert and oriented. Well-nourished. No distress. Head: Normocephalic and atraumatic. Right Ear: External ear normal. TM: no bulging, erythema or fluid seen. Left Ear: External ear normal. TM: no bulging, erythema or fluid seen. Nose: Nose normal.   Mouth/Throat: Oropharynx is clear and moist. teeth in good repair. Eyes: Pupils are equal, round, and reactive to light. Right eye exhibits no discharge. Left eye exhibits no discharge. No scleral icterus. Neck: Normal range of motion. Neck supple. No JVD present. No tracheal deviation present. No thyromegaly present. Cardiovascular: Normal rate, regular rhythm, normal heart sounds. Pulmonary/Chest: Effort normal and breath sounds normal. No respiratory distress. She has no wheezes.   She has no

## 2023-05-20 DIAGNOSIS — K21.9 GASTROESOPHAGEAL REFLUX DISEASE, UNSPECIFIED WHETHER ESOPHAGITIS PRESENT: ICD-10-CM

## 2023-05-22 RX ORDER — OMEPRAZOLE 40 MG/1
CAPSULE, DELAYED RELEASE ORAL
Qty: 90 CAPSULE | Refills: 0 | Status: SHIPPED | OUTPATIENT
Start: 2023-05-22

## 2023-05-30 ENCOUNTER — OFFICE VISIT (OUTPATIENT)
Dept: FAMILY MEDICINE CLINIC | Age: 43
End: 2023-05-30
Payer: COMMERCIAL

## 2023-05-30 VITALS
WEIGHT: 233 LBS | TEMPERATURE: 97.2 F | OXYGEN SATURATION: 100 % | HEART RATE: 100 BPM | BODY MASS INDEX: 41.27 KG/M2 | DIASTOLIC BLOOD PRESSURE: 71 MMHG | SYSTOLIC BLOOD PRESSURE: 117 MMHG

## 2023-05-30 DIAGNOSIS — E66.01 MORBID OBESITY WITH BMI OF 40.0-44.9, ADULT (HCC): ICD-10-CM

## 2023-05-30 DIAGNOSIS — B02.9 HERPES ZOSTER WITHOUT COMPLICATION: Primary | ICD-10-CM

## 2023-05-30 PROCEDURE — 99213 OFFICE O/P EST LOW 20 MIN: CPT | Performed by: FAMILY MEDICINE

## 2023-05-30 RX ORDER — PHENTERMINE HYDROCHLORIDE 37.5 MG/1
37.5 TABLET ORAL
Qty: 30 TABLET | Refills: 0 | Status: SHIPPED | OUTPATIENT
Start: 2023-05-30 | End: 2023-06-29

## 2023-05-30 ASSESSMENT — PATIENT HEALTH QUESTIONNAIRE - PHQ9
SUM OF ALL RESPONSES TO PHQ9 QUESTIONS 1 & 2: 0
SUM OF ALL RESPONSES TO PHQ QUESTIONS 1-9: 0
SUM OF ALL RESPONSES TO PHQ QUESTIONS 1-9: 0
2. FEELING DOWN, DEPRESSED OR HOPELESS: 0
SUM OF ALL RESPONSES TO PHQ QUESTIONS 1-9: 0
SUM OF ALL RESPONSES TO PHQ QUESTIONS 1-9: 0
1. LITTLE INTEREST OR PLEASURE IN DOING THINGS: 0

## 2023-05-30 NOTE — PROGRESS NOTES
metFORMIN (GLUCOPHAGE) 500 MG tablet TAKE ONE TABLET BY MOUTH TWICE A DAY WITH MEALS *APPT NEEDED* 60 tablet 2    Multiple Vitamin (MULTI VITAMIN DAILY PO) Take by mouth       No current facility-administered medications for this visit. ALLERGIES:    Allergies   Allergen Reactions    Bee Venom        Social History     Tobacco Use    Smoking status: Former    Smokeless tobacco: Never   Substance Use Topics    Alcohol use: Yes     Alcohol/week: 0.0 standard drinks     Comment: socially      Body mass index is 41.27 kg/m². /71   Pulse 100   Temp 97.2 °F (36.2 °C)   Wt 233 lb (105.7 kg)   SpO2 100%   BMI 41.27 kg/m²     Subjective:      HPI    38 y.o. female coming today for weight check. Lost 13 pounds with the 1. prescription of Adipex. Diet: less snacking, no sugar. Exercises: not to active bc of shingles. Under L bra area in back and under L breast. - still some pain there. Side effects: none    Review of Systems   Constitutional: Negative for fever and unexpected weight change. Pertinent items are noted in HPI. Objective:   Physical Exam  Constitutional: VS (see above). General appearance: normal development, habitus and attention, no deformities. No distress. Eyes: normal conjunctiva and lids. CAV: RRR, no RMG. No edema lower extremities. Pulmo: CTA bilateral, no CWR. Skin: no rashes, lesions or ulcers. Musculoskeletal: normal gait. Nails: no clubbing or cyanosis. Psychiatric: alert and oriented to place, time and person. Normal mood and affect. Assessment:       Diagnosis Orders   1. Herpes zoster without complication        2. Morbid obesity with BMI of 40.0-44.9, adult (HCC)  phentermine (ADIPEX-P) 37.5 MG tablet          Plan:   Patient states that she still has some discomfort from the shingles which has improved but again still there. She will continue to watch it she will let me know if there are any changes or concerns. Sec Prescription of Adipex provided.

## 2023-06-28 ENCOUNTER — OFFICE VISIT (OUTPATIENT)
Dept: FAMILY MEDICINE CLINIC | Age: 43
End: 2023-06-28
Payer: COMMERCIAL

## 2023-06-28 VITALS
BODY MASS INDEX: 40.57 KG/M2 | TEMPERATURE: 97.8 F | WEIGHT: 229 LBS | SYSTOLIC BLOOD PRESSURE: 111 MMHG | HEART RATE: 85 BPM | OXYGEN SATURATION: 100 % | DIASTOLIC BLOOD PRESSURE: 71 MMHG

## 2023-06-28 DIAGNOSIS — E28.2 PCOS (POLYCYSTIC OVARIAN SYNDROME): Primary | ICD-10-CM

## 2023-06-28 DIAGNOSIS — E66.01 MORBID OBESITY WITH BMI OF 40.0-44.9, ADULT (HCC): ICD-10-CM

## 2023-06-28 PROCEDURE — 99213 OFFICE O/P EST LOW 20 MIN: CPT | Performed by: FAMILY MEDICINE

## 2023-06-28 RX ORDER — PHENTERMINE HYDROCHLORIDE 37.5 MG/1
37.5 TABLET ORAL
Qty: 30 TABLET | Refills: 0 | Status: SHIPPED | OUTPATIENT
Start: 2023-06-28 | End: 2023-07-28

## 2023-07-31 DIAGNOSIS — E66.01 MORBID OBESITY WITH BMI OF 40.0-44.9, ADULT (HCC): ICD-10-CM

## 2023-07-31 RX ORDER — PHENTERMINE HYDROCHLORIDE 37.5 MG/1
37.5 TABLET ORAL
Qty: 30 TABLET | Refills: 0 | Status: SHIPPED | OUTPATIENT
Start: 2023-07-31 | End: 2023-08-30

## 2023-07-31 NOTE — TELEPHONE ENCOUNTER
Saeed March is calling to request a refill on the following medication(s):    Medication Request:  Requested Prescriptions     Pending Prescriptions Disp Refills    phentermine (ADIPEX-P) 37.5 MG tablet 30 tablet 0     Sig: Take 1 tablet by mouth every morning (before breakfast) for 30 days.        Last Visit Date (If Applicable):  2/46/2626    Next Visit Date:    Visit date not found

## 2023-09-05 DIAGNOSIS — E66.01 MORBID OBESITY WITH BMI OF 40.0-44.9, ADULT (HCC): Primary | ICD-10-CM

## 2023-09-05 RX ORDER — PHENTERMINE HYDROCHLORIDE 37.5 MG/1
37.5 TABLET ORAL
COMMUNITY
End: 2023-09-05 | Stop reason: SDUPTHER

## 2023-09-05 RX ORDER — PHENTERMINE HYDROCHLORIDE 37.5 MG/1
37.5 TABLET ORAL
Qty: 30 TABLET | Refills: 0 | Status: SHIPPED | OUTPATIENT
Start: 2023-09-05 | End: 2023-10-05

## 2023-09-06 DIAGNOSIS — K21.9 GASTROESOPHAGEAL REFLUX DISEASE, UNSPECIFIED WHETHER ESOPHAGITIS PRESENT: ICD-10-CM

## 2023-09-06 RX ORDER — OMEPRAZOLE 40 MG/1
CAPSULE, DELAYED RELEASE ORAL
Qty: 90 CAPSULE | Refills: 0 | Status: SHIPPED | OUTPATIENT
Start: 2023-09-06

## 2023-09-26 ENCOUNTER — PATIENT MESSAGE (OUTPATIENT)
Dept: FAMILY MEDICINE CLINIC | Age: 43
End: 2023-09-26

## 2023-09-27 NOTE — TELEPHONE ENCOUNTER
From: Travis Guzman  To: Dr. Shyam Tomas  Sent: 9/26/2023 5:00 PM EDT  Subject: Upcoming appointment     Hello! I have a video visit scheduled for this Friday, 9/29. 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 Is the video visit going to be okay for the 3 month check in for phentermine, or does it have to be in person? I believe you said video is fine, but wanted to make sure before Friday.     Thanks!    -Bennett Javier

## 2023-09-29 ENCOUNTER — TELEMEDICINE (OUTPATIENT)
Dept: FAMILY MEDICINE CLINIC | Age: 43
End: 2023-09-29
Payer: COMMERCIAL

## 2023-09-29 DIAGNOSIS — K21.9 GASTROESOPHAGEAL REFLUX DISEASE, UNSPECIFIED WHETHER ESOPHAGITIS PRESENT: Primary | ICD-10-CM

## 2023-09-29 DIAGNOSIS — E66.01 MORBID OBESITY WITH BMI OF 40.0-44.9, ADULT (HCC): ICD-10-CM

## 2023-09-29 PROCEDURE — 99213 OFFICE O/P EST LOW 20 MIN: CPT | Performed by: FAMILY MEDICINE

## 2023-09-29 RX ORDER — PHENTERMINE HYDROCHLORIDE 37.5 MG/1
37.5 TABLET ORAL
Qty: 30 TABLET | Refills: 0 | Status: SHIPPED | OUTPATIENT
Start: 2023-09-29 | End: 2023-10-29

## 2023-09-29 ASSESSMENT — PATIENT HEALTH QUESTIONNAIRE - PHQ9
SUM OF ALL RESPONSES TO PHQ QUESTIONS 1-9: 0
SUM OF ALL RESPONSES TO PHQ9 QUESTIONS 1 & 2: 0
SUM OF ALL RESPONSES TO PHQ QUESTIONS 1-9: 0
2. FEELING DOWN, DEPRESSED OR HOPELESS: 0
1. LITTLE INTEREST OR PLEASURE IN DOING THINGS: 0

## 2023-09-29 NOTE — PROGRESS NOTES
amount to the face QHS 45 g 4    spironolactone (ALDACTONE) 25 MG tablet TAKE ONE TABLET BY MOUTH DAILY 30 tablet 3    metFORMIN (GLUCOPHAGE) 500 MG tablet TAKE ONE TABLET BY MOUTH TWICE A DAY WITH MEALS *APPT NEEDED* 60 tablet 2    Multiple Vitamin (MULTI VITAMIN DAILY PO) Take by mouth       No current facility-administered medications for this visit. ALLERGIES:    Allergies   Allergen Reactions    Bee Venom        Social History     Tobacco Use    Smoking status: Former    Smokeless tobacco: Never   Substance Use Topics    Alcohol use: Yes     Alcohol/week: 3.0 standard drinks of alcohol     Types: 3 Drinks containing 0.5 oz of alcohol per week     Comment: socially      There is no height or weight on file to calculate BMI. There were no vitals taken for this visit. Subjective:      HPI    41 y.o. female reaching out per tele med visit today. Reaching out today for a weight check. The patient's weight was 229 in June 2023. The patient has been taking Adipex. Current weight: 214 lbs. Lost 16 lbs. Diet: healthy, small portions, no sugary coffee and other sugar. Exercises: not too much bc of a heavy work load. Side effects: none    Review of Systems   Constitutional: Negative for fever and unexpected weight change. Objective:   Physical Exam  General appearance: normal development, habitus and attention, no deformities. No distress. Pulmo: normal breathing pattern. Psychiatric: alert and oriented to place, time and person. Normal mood and affect. Assessment:       Diagnosis Orders   1. Gastroesophageal reflux disease, unspecified whether esophagitis present        2. Morbid obesity with BMI of 40.0-44.9, adult (HCC)  phentermine (ADIPEX-P) 37.5 MG tablet          Plan:      Call or return to clinic prn if these symptoms worsen or fail to improve as anticipated. I have reviewed the instructions with the patient, answering all questions to her satisfaction. Allyson Fontanez is a 41 y.o.

## 2023-11-13 ENCOUNTER — OFFICE VISIT (OUTPATIENT)
Dept: FAMILY MEDICINE CLINIC | Age: 43
End: 2023-11-13
Payer: COMMERCIAL

## 2023-11-13 VITALS
SYSTOLIC BLOOD PRESSURE: 111 MMHG | WEIGHT: 216 LBS | BODY MASS INDEX: 38.26 KG/M2 | DIASTOLIC BLOOD PRESSURE: 76 MMHG | OXYGEN SATURATION: 96 % | HEART RATE: 89 BPM

## 2023-11-13 DIAGNOSIS — E66.09 CLASS 2 OBESITY DUE TO EXCESS CALORIES WITHOUT SERIOUS COMORBIDITY WITH BODY MASS INDEX (BMI) OF 38.0 TO 38.9 IN ADULT: ICD-10-CM

## 2023-11-13 DIAGNOSIS — K21.9 GASTROESOPHAGEAL REFLUX DISEASE, UNSPECIFIED WHETHER ESOPHAGITIS PRESENT: Primary | ICD-10-CM

## 2023-11-13 PROCEDURE — 99213 OFFICE O/P EST LOW 20 MIN: CPT | Performed by: FAMILY MEDICINE

## 2023-11-13 RX ORDER — PHENTERMINE HYDROCHLORIDE 37.5 MG/1
37.5 TABLET ORAL
Qty: 30 TABLET | Refills: 0 | Status: SHIPPED | OUTPATIENT
Start: 2023-11-13 | End: 2023-12-13

## 2023-11-15 DIAGNOSIS — K21.9 GASTROESOPHAGEAL REFLUX DISEASE, UNSPECIFIED WHETHER ESOPHAGITIS PRESENT: ICD-10-CM

## 2023-11-16 RX ORDER — OMEPRAZOLE 40 MG/1
CAPSULE, DELAYED RELEASE ORAL
Qty: 90 CAPSULE | Refills: 0 | Status: SHIPPED | OUTPATIENT
Start: 2023-11-16

## 2023-11-26 DIAGNOSIS — L65.8 FEMALE PATTERN ALOPECIA: ICD-10-CM

## 2023-11-27 RX ORDER — MINOXIDIL 2.5 MG/1
TABLET ORAL
Qty: 15 TABLET | Refills: 5 | Status: SHIPPED | OUTPATIENT
Start: 2023-11-27

## 2023-12-11 DIAGNOSIS — R25.2 MUSCLE CRAMPS: ICD-10-CM

## 2023-12-12 RX ORDER — CYCLOBENZAPRINE HCL 5 MG
TABLET ORAL
Qty: 30 TABLET | Refills: 0 | Status: SHIPPED | OUTPATIENT
Start: 2023-12-12

## 2024-01-25 DIAGNOSIS — E66.01 MORBID OBESITY WITH BMI OF 40.0-44.9, ADULT (HCC): Primary | ICD-10-CM

## 2024-01-25 RX ORDER — PHENTERMINE HYDROCHLORIDE 37.5 MG/1
37.5 TABLET ORAL
Qty: 30 TABLET | Refills: 0 | Status: SHIPPED | OUTPATIENT
Start: 2024-01-25 | End: 2024-02-24

## 2024-01-25 NOTE — TELEPHONE ENCOUNTER
Deandra Rivers is calling to request a refill on the following medication(s):    Last Visit Date (If Applicable):  11/13/2023    Next Visit Date:    Visit date not found    Medication Request:  Requested Prescriptions     Pending Prescriptions Disp Refills    phentermine (ADIPEX-P) 37.5 MG tablet 30 tablet 0     Sig: Take 1 tablet by mouth every morning (before breakfast) for 30 days. Max Daily Amount: 37.5 mg

## 2024-03-06 DIAGNOSIS — K21.9 GASTROESOPHAGEAL REFLUX DISEASE, UNSPECIFIED WHETHER ESOPHAGITIS PRESENT: ICD-10-CM

## 2024-03-06 RX ORDER — OMEPRAZOLE 40 MG/1
CAPSULE, DELAYED RELEASE ORAL
Qty: 90 CAPSULE | Refills: 0 | Status: SHIPPED | OUTPATIENT
Start: 2024-03-06

## 2024-03-07 ENCOUNTER — TELEMEDICINE (OUTPATIENT)
Dept: FAMILY MEDICINE CLINIC | Age: 44
End: 2024-03-07
Payer: COMMERCIAL

## 2024-03-07 DIAGNOSIS — K21.9 GASTROESOPHAGEAL REFLUX DISEASE, UNSPECIFIED WHETHER ESOPHAGITIS PRESENT: Primary | ICD-10-CM

## 2024-03-07 DIAGNOSIS — E66.1 CLASS 2 DRUG-INDUCED OBESITY WITHOUT SERIOUS COMORBIDITY WITH BODY MASS INDEX (BMI) OF 38.0 TO 38.9 IN ADULT: ICD-10-CM

## 2024-03-07 PROCEDURE — 99213 OFFICE O/P EST LOW 20 MIN: CPT | Performed by: FAMILY MEDICINE

## 2024-03-07 RX ORDER — PHENTERMINE HYDROCHLORIDE 37.5 MG/1
37.5 TABLET ORAL
Qty: 30 TABLET | Refills: 0 | Status: SHIPPED | OUTPATIENT
Start: 2024-03-07 | End: 2024-04-06

## 2024-03-07 ASSESSMENT — PATIENT HEALTH QUESTIONNAIRE - PHQ9
1. LITTLE INTEREST OR PLEASURE IN DOING THINGS: 0
SUM OF ALL RESPONSES TO PHQ9 QUESTIONS 1 & 2: 0
SUM OF ALL RESPONSES TO PHQ QUESTIONS 1-9: 0
SUM OF ALL RESPONSES TO PHQ QUESTIONS 1-9: 0
2. FEELING DOWN, DEPRESSED OR HOPELESS: 0
SUM OF ALL RESPONSES TO PHQ QUESTIONS 1-9: 0
SUM OF ALL RESPONSES TO PHQ QUESTIONS 1-9: 0

## 2024-03-07 NOTE — PROGRESS NOTES
General FM note    TeleHealth encounter -- Audio/Visual (During COVID-19 public health emergency)    Deandra Rivers is a 43 y.o. female who presents today for follow up on her  medical conditions as noted below.  Deandra Rivers is c/o of   Chief Complaint   Patient presents with    Weight Management       Patient Active Problem List:     Abnormal uterine bleeding (AUB)     Menorrhagia with irregular cycle     PCOS (polycystic ovarian syndrome)     Gastroesophageal reflux disease without esophagitis     Past Medical History:   Diagnosis Date    Anxiety     Dizziness     Gall bladder pain     Headache(784.0)     HSV-1 (herpes simplex virus 1) infection     Hyperthyroidism     Obesity       Past Surgical History:   Procedure Laterality Date    ENDOMETRIAL BIOPSY  08/09/2016    negative atypia or malignancy    INTRAUTERINE DEVICE INSERTION  01/13/2017    Mirena     Family History   Problem Relation Age of Onset    Lupus Father     Stroke Father     Alcohol Abuse Father     Alcohol Abuse Mother     Thyroid Disease Mother      Current Outpatient Medications   Medication Sig Dispense Refill    phentermine (ADIPEX-P) 37.5 MG tablet Take 1 tablet by mouth every morning (before breakfast) for 30 days. Max Daily Amount: 37.5 mg 30 tablet 0    omeprazole (PRILOSEC) 40 MG delayed release capsule TAKE 1 CAPSULE BY MOUTH DAILY 90 capsule 0    cyclobenzaprine (FLEXERIL) 5 MG tablet TAKE ONE TABLET BY MOUTH ONCE NIGHTLY AS NEEDED FOR MUSCLE SPASMS 30 tablet 0    minoxidil (LONITEN) 2.5 MG tablet TAKE 1/2 TABLET BY MOUTH DAILY 15 tablet 5    ibuprofen (ADVIL;MOTRIN) 800 MG tablet TAKE ONE TABLET BY MOUTH EVERY 6 HOURS AS NEEDED FOR PAIN 120 tablet 1    adapalene (DIFFERIN) 0.1 % gel Apply a pea sized amount to the face QHS 45 g 4    Multiple Vitamin (MULTI VITAMIN DAILY PO) Take by mouth      dutasteride (AVODART) 0.5 MG capsule TAKE ONE CAPSULE BY MOUTH DAILY (Patient not taking: Reported on 11/13/2023) 30 capsule 3    clindamycin

## 2024-04-11 ENCOUNTER — OFFICE VISIT (OUTPATIENT)
Dept: FAMILY MEDICINE CLINIC | Age: 44
End: 2024-04-11
Payer: COMMERCIAL

## 2024-04-11 VITALS
BODY MASS INDEX: 38.09 KG/M2 | TEMPERATURE: 97.2 F | SYSTOLIC BLOOD PRESSURE: 113 MMHG | DIASTOLIC BLOOD PRESSURE: 74 MMHG | WEIGHT: 215 LBS | OXYGEN SATURATION: 100 % | HEART RATE: 117 BPM

## 2024-04-11 DIAGNOSIS — K21.9 GASTROESOPHAGEAL REFLUX DISEASE, UNSPECIFIED WHETHER ESOPHAGITIS PRESENT: ICD-10-CM

## 2024-04-11 DIAGNOSIS — E66.1 CLASS 2 DRUG-INDUCED OBESITY WITHOUT SERIOUS COMORBIDITY WITH BODY MASS INDEX (BMI) OF 38.0 TO 38.9 IN ADULT: Primary | ICD-10-CM

## 2024-04-11 PROCEDURE — 99213 OFFICE O/P EST LOW 20 MIN: CPT | Performed by: FAMILY MEDICINE

## 2024-04-11 RX ORDER — BUPROPION HYDROCHLORIDE 150 MG/1
150 TABLET ORAL EVERY MORNING
Qty: 30 TABLET | Refills: 3 | Status: SHIPPED | OUTPATIENT
Start: 2024-04-11

## 2024-04-11 RX ORDER — PHENTERMINE HYDROCHLORIDE 37.5 MG/1
37.5 TABLET ORAL
Qty: 30 TABLET | Refills: 0 | Status: CANCELLED | OUTPATIENT
Start: 2024-04-11 | End: 2024-05-11

## 2024-04-11 ASSESSMENT — PATIENT HEALTH QUESTIONNAIRE - PHQ9
SUM OF ALL RESPONSES TO PHQ QUESTIONS 1-9: 0
1. LITTLE INTEREST OR PLEASURE IN DOING THINGS: NOT AT ALL
2. FEELING DOWN, DEPRESSED OR HOPELESS: NOT AT ALL
SUM OF ALL RESPONSES TO PHQ QUESTIONS 1-9: 0
SUM OF ALL RESPONSES TO PHQ QUESTIONS 1-9: 0
SUM OF ALL RESPONSES TO PHQ9 QUESTIONS 1 & 2: 0
SUM OF ALL RESPONSES TO PHQ QUESTIONS 1-9: 0

## 2024-04-11 NOTE — PROGRESS NOTES
comorbidity with body mass index (BMI) of 38.0 to 38.9 in adult  buPROPion (WELLBUTRIN XL) 150 MG extended release tablet      2. Gastroesophageal reflux disease, unspecified whether esophagitis present            Plan:      I discussed with her that I am not able to continue the Adipex as she has not tested for weight.  The patient is open to start Wellbutrin as off label use for weight loss but also she feels this could be help for for her overall slight dysthymia anxiety.  She will let me know if there are any changes or concerns.  She will let me know    Return in about 3 months (around 7/11/2024), or if symptoms worsen or fail to improve, for weight.  No orders of the defined types were placed in this encounter.    Orders Placed This Encounter   Medications    buPROPion (WELLBUTRIN XL) 150 MG extended release tablet     Sig: Take 1 tablet by mouth every morning     Dispense:  30 tablet     Refill:  3       Call or return to clinic prn if these symptoms worsen or fail to improve as anticipated.  I have reviewed the instructions with the patient, answering all questions to her satisfaction.      Nacori received counseling on the following healthy behaviors: nutrition, exercise, and medication adherence  Reviewed prior labs and health maintenance.  Continue current medications, diet and exercise.  Discussed use, benefit, and side effects of prescribed medications. Barriers to medication compliance addressed.   Patient given educational materials - see patient instructions.    All patient questions answered.  Patient voiced understanding.      Electronically signed by Beronica Maldonado MD on 4/12/2024 at 7:11 AM       (Please note that portions of this note were completed with a voice recognition program. Efforts were made to edit the dictations but occasionally words are mis-transcribed.)

## 2024-04-11 NOTE — PATIENT INSTRUCTIONS
Thank you for choosing [facility].  We know you have options when it comes to your healthcare; we appreciate that you chose us. Our goal is to provide exceptional  service and world class care to every patient.  You will be receiving a survey via email or text message asking for your feedback.  Please take a few minutes to share your thoughts about your recent visit. Your comments helps us understand what we do well and ways we can improve.  Thank you in advance for your valuable feedback.

## 2024-06-02 DIAGNOSIS — K21.9 GASTROESOPHAGEAL REFLUX DISEASE, UNSPECIFIED WHETHER ESOPHAGITIS PRESENT: ICD-10-CM

## 2024-06-03 RX ORDER — OMEPRAZOLE 40 MG/1
CAPSULE, DELAYED RELEASE ORAL
Qty: 90 CAPSULE | Refills: 0 | Status: SHIPPED | OUTPATIENT
Start: 2024-06-03

## 2024-07-13 DIAGNOSIS — L65.8 FEMALE PATTERN ALOPECIA: ICD-10-CM

## 2024-07-15 RX ORDER — MINOXIDIL 2.5 MG/1
TABLET ORAL
Qty: 15 TABLET | Refills: 5 | Status: SHIPPED | OUTPATIENT
Start: 2024-07-15

## 2024-07-24 ENCOUNTER — OFFICE VISIT (OUTPATIENT)
Dept: FAMILY MEDICINE CLINIC | Age: 44
End: 2024-07-24
Payer: COMMERCIAL

## 2024-07-24 ENCOUNTER — PATIENT MESSAGE (OUTPATIENT)
Dept: FAMILY MEDICINE CLINIC | Age: 44
End: 2024-07-24

## 2024-07-24 VITALS
HEIGHT: 63 IN | WEIGHT: 223 LBS | BODY MASS INDEX: 39.51 KG/M2 | DIASTOLIC BLOOD PRESSURE: 80 MMHG | HEART RATE: 98 BPM | SYSTOLIC BLOOD PRESSURE: 113 MMHG | OXYGEN SATURATION: 100 % | TEMPERATURE: 97.3 F

## 2024-07-24 DIAGNOSIS — K21.9 GASTROESOPHAGEAL REFLUX DISEASE, UNSPECIFIED WHETHER ESOPHAGITIS PRESENT: Primary | ICD-10-CM

## 2024-07-24 DIAGNOSIS — E66.1 CLASS 2 DRUG-INDUCED OBESITY WITHOUT SERIOUS COMORBIDITY WITH BODY MASS INDEX (BMI) OF 38.0 TO 38.9 IN ADULT: ICD-10-CM

## 2024-07-24 PROCEDURE — 99213 OFFICE O/P EST LOW 20 MIN: CPT | Performed by: FAMILY MEDICINE

## 2024-07-24 RX ORDER — PHENTERMINE HYDROCHLORIDE 37.5 MG/1
37.5 TABLET ORAL
Qty: 30 TABLET | Refills: 0 | Status: SHIPPED | OUTPATIENT
Start: 2024-07-24 | End: 2024-08-23

## 2024-07-24 RX ORDER — PHENTERMINE HYDROCHLORIDE 37.5 MG/1
37.5 TABLET ORAL
Qty: 30 TABLET | Refills: 0 | Status: CANCELLED | OUTPATIENT
Start: 2024-07-24 | End: 2024-08-23

## 2024-07-24 NOTE — PROGRESS NOTES
General FM note    Deandra Rivers is a 43 y.o. female who presents today for follow up on her  medical conditions as noted below.  Deandra Rivers is c/o of   Chief Complaint   Patient presents with    Weight Management       Patient Active Problem List:     Abnormal uterine bleeding (AUB)     Menorrhagia with irregular cycle     PCOS (polycystic ovarian syndrome)     Gastroesophageal reflux disease without esophagitis     Past Medical History:   Diagnosis Date    Anxiety     Dizziness     Gall bladder pain     Headache(784.0)     HSV-1 (herpes simplex virus 1) infection     Hyperthyroidism     Obesity       Past Surgical History:   Procedure Laterality Date    ENDOMETRIAL BIOPSY  08/09/2016    negative atypia or malignancy    INTRAUTERINE DEVICE INSERTION  01/13/2017    Mirena     Family History   Problem Relation Age of Onset    Lupus Father     Stroke Father     Alcohol Abuse Father     Alcohol Abuse Mother     Thyroid Disease Mother      Current Outpatient Medications   Medication Sig Dispense Refill    phentermine (ADIPEX-P) 37.5 MG tablet Take 1 tablet by mouth every morning (before breakfast) for 30 days. Max Daily Amount: 37.5 mg 30 tablet 0    minoxidil (LONITEN) 2.5 MG tablet TAKE 1/2 TABLET BY MOUTH DAILY 15 tablet 5    omeprazole (PRILOSEC) 40 MG delayed release capsule TAKE 1 CAPSULE BY MOUTH DAILY 90 capsule 0    buPROPion (WELLBUTRIN XL) 150 MG extended release tablet Take 1 tablet by mouth every morning 30 tablet 3    cyclobenzaprine (FLEXERIL) 5 MG tablet TAKE ONE TABLET BY MOUTH ONCE NIGHTLY AS NEEDED FOR MUSCLE SPASMS 30 tablet 0    ibuprofen (ADVIL;MOTRIN) 800 MG tablet TAKE ONE TABLET BY MOUTH EVERY 6 HOURS AS NEEDED FOR PAIN 120 tablet 1    adapalene (DIFFERIN) 0.1 % gel Apply a pea sized amount to the face QHS 45 g 4    Multiple Vitamin (MULTI VITAMIN DAILY PO) Take by mouth      dutasteride (AVODART) 0.5 MG capsule TAKE ONE CAPSULE BY MOUTH DAILY (Patient not taking: Reported on 11/13/2023) 30

## 2024-07-25 NOTE — TELEPHONE ENCOUNTER
From: Deandra Rivers  To: Dr. Beronica Maldonado  Sent: 7/24/2024 2:52 PM EDT  Subject: Prescription interaction    Hello! I am curious about the interactions between the phentermine that I got a new prescription for today, and the wellbutrin I have been taking for 3+ months now. I noticed they have similar side effects and I have read recommendations that say not to take them together because they have a major chance of interacting negatively, and together can cause serotonin syndrome.   The first couple of times I took phentermine it would cause some jitters, some mild anxiety, and it kept me up at night if I took it too late in the morning. But I haven't noticed any of those side effects the last couple of times I've taken it. As far as the wellbutrin, I haven't really noticed any negative side effects at all. In fact it seems to be helping me with less sugar cravings like I mentioned, and I've also noticed I'm less \"cannon.\" It also seems to help a lot with my adhd symptoms like never being able to relax, always having a hundred different things going on at once, inability to focus on the one thing I need to work on, etc. I can actually FINISH things now before doing twenty other things. I've also read that you shouldn't just quickly stop taking wellbutrin if you can avoid doing that.  I don't have a history of high anxiety, no seizures, no high blood pressure, or anything like that which I read are signs of a dangerous interaction between the two drugs.  Is this interaction something I should be concerned about? Or am I safe to take both as long as I don't notice any abnormal symptoms?

## 2024-08-09 ENCOUNTER — PATIENT MESSAGE (OUTPATIENT)
Dept: FAMILY MEDICINE CLINIC | Age: 44
End: 2024-08-09

## 2024-08-09 NOTE — TELEPHONE ENCOUNTER
From: Deandra Rivers  To: Dr. Beronica aMldonado  Sent: 8/9/2024 10:24 AM EDT  Subject: Next appointment     Hello.  I just want to confirm that I should be making an appointment for about 3 months after my last one, which is when I restarted my phentermine prescription. I know previously that I had to come in every month, but then the law changed and I just had to call in for my refill, then come in for an office visit to weigh in every three months. At the three month dimitri I have to have lost 5% of my weight in order to continue. Is that correct? Or do I have to make an appointment for every month?

## 2024-08-12 DIAGNOSIS — E66.1 CLASS 2 DRUG-INDUCED OBESITY WITHOUT SERIOUS COMORBIDITY WITH BODY MASS INDEX (BMI) OF 38.0 TO 38.9 IN ADULT: ICD-10-CM

## 2024-08-12 RX ORDER — BUPROPION HYDROCHLORIDE 150 MG/1
150 TABLET ORAL EVERY MORNING
Qty: 30 TABLET | Refills: 3 | Status: SHIPPED | OUTPATIENT
Start: 2024-08-12

## 2024-08-29 ENCOUNTER — OFFICE VISIT (OUTPATIENT)
Dept: FAMILY MEDICINE CLINIC | Age: 44
End: 2024-08-29
Payer: COMMERCIAL

## 2024-08-29 VITALS
SYSTOLIC BLOOD PRESSURE: 117 MMHG | TEMPERATURE: 97 F | OXYGEN SATURATION: 100 % | WEIGHT: 216 LBS | BODY MASS INDEX: 38.27 KG/M2 | HEART RATE: 104 BPM | DIASTOLIC BLOOD PRESSURE: 79 MMHG

## 2024-08-29 DIAGNOSIS — S13.9XXA CERVICAL SPRAIN, INITIAL ENCOUNTER: ICD-10-CM

## 2024-08-29 DIAGNOSIS — Z13.6 ENCOUNTER FOR LIPID SCREENING FOR CARDIOVASCULAR DISEASE: ICD-10-CM

## 2024-08-29 DIAGNOSIS — E66.1 CLASS 2 DRUG-INDUCED OBESITY WITHOUT SERIOUS COMORBIDITY WITH BODY MASS INDEX (BMI) OF 38.0 TO 38.9 IN ADULT: ICD-10-CM

## 2024-08-29 DIAGNOSIS — G44.209 MUSCLE TENSION HEADACHE: ICD-10-CM

## 2024-08-29 DIAGNOSIS — Z13.220 ENCOUNTER FOR LIPID SCREENING FOR CARDIOVASCULAR DISEASE: ICD-10-CM

## 2024-08-29 DIAGNOSIS — Z12.31 ENCOUNTER FOR SCREENING MAMMOGRAM FOR MALIGNANT NEOPLASM OF BREAST: ICD-10-CM

## 2024-08-29 DIAGNOSIS — Z13.1 DIABETES MELLITUS SCREENING: ICD-10-CM

## 2024-08-29 DIAGNOSIS — K21.9 GASTROESOPHAGEAL REFLUX DISEASE, UNSPECIFIED WHETHER ESOPHAGITIS PRESENT: Primary | ICD-10-CM

## 2024-08-29 PROCEDURE — 99213 OFFICE O/P EST LOW 20 MIN: CPT | Performed by: FAMILY MEDICINE

## 2024-08-29 RX ORDER — IBUPROFEN 800 MG/1
800 TABLET, FILM COATED ORAL EVERY 6 HOURS PRN
Qty: 120 TABLET | Refills: 1 | Status: SHIPPED | OUTPATIENT
Start: 2024-08-29

## 2024-08-29 RX ORDER — PHENTERMINE HYDROCHLORIDE 37.5 MG/1
37.5 TABLET ORAL
Qty: 30 TABLET | Refills: 0 | Status: SHIPPED | OUTPATIENT
Start: 2024-08-29 | End: 2024-09-28

## 2024-08-29 ASSESSMENT — PATIENT HEALTH QUESTIONNAIRE - PHQ9
SUM OF ALL RESPONSES TO PHQ QUESTIONS 1-9: 0
SUM OF ALL RESPONSES TO PHQ9 QUESTIONS 1 & 2: 0
SUM OF ALL RESPONSES TO PHQ QUESTIONS 1-9: 0
1. LITTLE INTEREST OR PLEASURE IN DOING THINGS: NOT AT ALL
2. FEELING DOWN, DEPRESSED OR HOPELESS: NOT AT ALL

## 2024-08-29 NOTE — PROGRESS NOTES
Beronica Maldonado MD on 8/29/2024 at 4:14 PM       (Please note that portions of this note were completed with a voice recognition program. Efforts were made to edit the dictations but occasionally words are mis-transcribed.)

## 2024-09-02 DIAGNOSIS — K21.9 GASTROESOPHAGEAL REFLUX DISEASE, UNSPECIFIED WHETHER ESOPHAGITIS PRESENT: ICD-10-CM

## 2024-09-03 RX ORDER — OMEPRAZOLE 40 MG/1
CAPSULE, DELAYED RELEASE ORAL
Qty: 90 CAPSULE | Refills: 0 | Status: SHIPPED | OUTPATIENT
Start: 2024-09-03

## 2024-10-01 ENCOUNTER — HOSPITAL ENCOUNTER (OUTPATIENT)
Age: 44
Discharge: HOME | End: 2024-10-01
Payer: COMMERCIAL

## 2024-10-01 ENCOUNTER — OFFICE VISIT (OUTPATIENT)
Dept: FAMILY MEDICINE CLINIC | Age: 44
End: 2024-10-01
Payer: COMMERCIAL

## 2024-10-01 VITALS
BODY MASS INDEX: 37.92 KG/M2 | OXYGEN SATURATION: 100 % | SYSTOLIC BLOOD PRESSURE: 104 MMHG | WEIGHT: 214 LBS | DIASTOLIC BLOOD PRESSURE: 71 MMHG | HEART RATE: 90 BPM | TEMPERATURE: 97.3 F

## 2024-10-01 DIAGNOSIS — Z13.220 ENCOUNTER FOR LIPID SCREENING FOR CARDIOVASCULAR DISEASE: ICD-10-CM

## 2024-10-01 DIAGNOSIS — E66.812 CLASS 2 DRUG-INDUCED OBESITY WITHOUT SERIOUS COMORBIDITY WITH BODY MASS INDEX (BMI) OF 38.0 TO 38.9 IN ADULT: ICD-10-CM

## 2024-10-01 DIAGNOSIS — E66.1 CLASS 2 DRUG-INDUCED OBESITY WITHOUT SERIOUS COMORBIDITY WITH BODY MASS INDEX (BMI) OF 38.0 TO 38.9 IN ADULT: ICD-10-CM

## 2024-10-01 DIAGNOSIS — K21.9 GASTROESOPHAGEAL REFLUX DISEASE, UNSPECIFIED WHETHER ESOPHAGITIS PRESENT: ICD-10-CM

## 2024-10-01 DIAGNOSIS — Z13.6 ENCOUNTER FOR LIPID SCREENING FOR CARDIOVASCULAR DISEASE: ICD-10-CM

## 2024-10-01 DIAGNOSIS — Z13.1 DIABETES MELLITUS SCREENING: ICD-10-CM

## 2024-10-01 LAB
ALBUMIN SERPL-MCNC: 4.6 G/DL (ref 3.5–5.2)
ALBUMIN/GLOB SERPL: 2 {RATIO} (ref 1–2.5)
ALP SERPL-CCNC: 51 U/L (ref 35–104)
ALT SERPL-CCNC: 10 U/L (ref 10–35)
ANION GAP SERPL CALCULATED.3IONS-SCNC: 10 MMOL/L (ref 9–16)
AST SERPL-CCNC: 20 U/L (ref 10–35)
BASOPHILS # BLD: <0.03 K/UL (ref 0–0.2)
BASOPHILS NFR BLD: 0 % (ref 0–2)
BILIRUB SERPL-MCNC: 0.5 MG/DL (ref 0–1.2)
BUN SERPL-MCNC: 11 MG/DL (ref 6–20)
CALCIUM SERPL-MCNC: 9.4 MG/DL (ref 8.6–10.4)
CHLORIDE SERPL-SCNC: 105 MMOL/L (ref 98–107)
CHOLEST SERPL-MCNC: 210 MG/DL (ref 0–199)
CHOLESTEROL/HDL RATIO: 4
CO2 SERPL-SCNC: 24 MMOL/L (ref 20–31)
CREAT SERPL-MCNC: 1.1 MG/DL (ref 0.5–0.9)
EOSINOPHIL # BLD: 0.11 K/UL (ref 0–0.44)
EOSINOPHILS RELATIVE PERCENT: 2 % (ref 1–4)
ERYTHROCYTE [DISTWIDTH] IN BLOOD BY AUTOMATED COUNT: 12.3 % (ref 11.8–14.4)
EST. AVERAGE GLUCOSE BLD GHB EST-MCNC: 111 MG/DL
GFR, ESTIMATED: 68 ML/MIN/1.73M2
GLUCOSE SERPL-MCNC: 109 MG/DL (ref 74–99)
HBA1C MFR BLD: 5.5 % (ref 4–6)
HCT VFR BLD AUTO: 41.6 % (ref 36.3–47.1)
HDLC SERPL-MCNC: 51 MG/DL
HGB BLD-MCNC: 14.2 G/DL (ref 11.9–15.1)
IMM GRANULOCYTES # BLD AUTO: <0.03 K/UL (ref 0–0.3)
IMM GRANULOCYTES NFR BLD: 0 %
LDLC SERPL CALC-MCNC: 142 MG/DL (ref 0–100)
LYMPHOCYTES NFR BLD: 1.73 K/UL (ref 1.1–3.7)
LYMPHOCYTES RELATIVE PERCENT: 34 % (ref 24–43)
MCH RBC QN AUTO: 30.4 PG (ref 25.2–33.5)
MCHC RBC AUTO-ENTMCNC: 34.1 G/DL (ref 28.4–34.8)
MCV RBC AUTO: 89.1 FL (ref 82.6–102.9)
MONOCYTES NFR BLD: 0.69 K/UL (ref 0.1–1.2)
MONOCYTES NFR BLD: 14 % (ref 3–12)
NEUTROPHILS NFR BLD: 50 % (ref 36–65)
NEUTS SEG NFR BLD: 2.49 K/UL (ref 1.5–8.1)
NRBC BLD-RTO: 0 PER 100 WBC
PLATELET # BLD AUTO: 344 K/UL (ref 138–453)
PMV BLD AUTO: 8.8 FL (ref 8.1–13.5)
POTASSIUM SERPL-SCNC: 3.8 MMOL/L (ref 3.7–5.3)
PROT SERPL-MCNC: 7.6 G/DL (ref 6.6–8.7)
RBC # BLD AUTO: 4.67 M/UL (ref 3.95–5.11)
SODIUM SERPL-SCNC: 139 MMOL/L (ref 136–145)
TRIGL SERPL-MCNC: 86 MG/DL
TSH SERPL DL<=0.05 MIU/L-ACNC: 3.68 UIU/ML (ref 0.27–4.2)
VLDLC SERPL CALC-MCNC: 17 MG/DL
WBC OTHER # BLD: 5.1 K/UL (ref 3.5–11.3)

## 2024-10-01 PROCEDURE — 84443 ASSAY THYROID STIM HORMONE: CPT

## 2024-10-01 PROCEDURE — 99213 OFFICE O/P EST LOW 20 MIN: CPT | Performed by: FAMILY MEDICINE

## 2024-10-01 PROCEDURE — 36415 COLL VENOUS BLD VENIPUNCTURE: CPT

## 2024-10-01 PROCEDURE — 83036 HEMOGLOBIN GLYCOSYLATED A1C: CPT

## 2024-10-01 PROCEDURE — 80053 COMPREHEN METABOLIC PANEL: CPT

## 2024-10-01 PROCEDURE — 80061 LIPID PANEL: CPT

## 2024-10-01 PROCEDURE — 85025 COMPLETE CBC W/AUTO DIFF WBC: CPT

## 2024-10-01 RX ORDER — PHENTERMINE HYDROCHLORIDE 37.5 MG/1
37.5 TABLET ORAL
Qty: 30 TABLET | Refills: 0 | Status: SHIPPED | OUTPATIENT
Start: 2024-10-01 | End: 2024-10-31

## 2024-10-01 NOTE — PROGRESS NOTES
General FM note    Deandra Rivers is a 44 y.o. female who presents today for follow up on her  medical conditions as noted below.  Deandra Rivers is c/o of   Chief Complaint   Patient presents with    Weight Management       Patient Active Problem List:     Abnormal uterine bleeding (AUB)     Menorrhagia with irregular cycle     PCOS (polycystic ovarian syndrome)     Gastroesophageal reflux disease without esophagitis     Past Medical History:   Diagnosis Date    Anxiety     Dizziness     Gall bladder pain     Headache(784.0)     HSV-1 (herpes simplex virus 1) infection     Hyperthyroidism     Obesity       Past Surgical History:   Procedure Laterality Date    ENDOMETRIAL BIOPSY  08/09/2016    negative atypia or malignancy    INTRAUTERINE DEVICE INSERTION  01/13/2017    Mirena     Family History   Problem Relation Age of Onset    Lupus Father     Stroke Father     Alcohol Abuse Father     Alcohol Abuse Mother     Thyroid Disease Mother      Current Outpatient Medications   Medication Sig Dispense Refill    phentermine (ADIPEX-P) 37.5 MG tablet Take 1 tablet by mouth every morning (before breakfast) for 30 days. Max Daily Amount: 37.5 mg 30 tablet 0    omeprazole (PRILOSEC) 40 MG delayed release capsule TAKE 1 CAPSULE BY MOUTH DAILY 90 capsule 0    ibuprofen (ADVIL;MOTRIN) 800 MG tablet Take 1 tablet by mouth every 6 hours as needed for Pain 120 tablet 1    buPROPion (WELLBUTRIN XL) 150 MG extended release tablet TAKE 1 TABLET BY MOUTH EVERY MORNING 30 tablet 3    minoxidil (LONITEN) 2.5 MG tablet TAKE 1/2 TABLET BY MOUTH DAILY 15 tablet 5    cyclobenzaprine (FLEXERIL) 5 MG tablet TAKE ONE TABLET BY MOUTH ONCE NIGHTLY AS NEEDED FOR MUSCLE SPASMS 30 tablet 0    adapalene (DIFFERIN) 0.1 % gel Apply a pea sized amount to the face QHS 45 g 4    Multiple Vitamin (MULTI VITAMIN DAILY PO) Take by mouth      dutasteride (AVODART) 0.5 MG capsule TAKE ONE CAPSULE BY MOUTH DAILY (Patient not taking: Reported on 11/13/2023) 30

## 2024-11-06 ENCOUNTER — OFFICE VISIT (OUTPATIENT)
Dept: FAMILY MEDICINE CLINIC | Age: 44
End: 2024-11-06
Payer: COMMERCIAL

## 2024-11-06 VITALS
WEIGHT: 211 LBS | SYSTOLIC BLOOD PRESSURE: 114 MMHG | OXYGEN SATURATION: 100 % | HEART RATE: 97 BPM | TEMPERATURE: 97.1 F | DIASTOLIC BLOOD PRESSURE: 81 MMHG | BODY MASS INDEX: 37.39 KG/M2

## 2024-11-06 DIAGNOSIS — E66.812 CLASS 2 DRUG-INDUCED OBESITY WITHOUT SERIOUS COMORBIDITY WITH BODY MASS INDEX (BMI) OF 38.0 TO 38.9 IN ADULT: ICD-10-CM

## 2024-11-06 DIAGNOSIS — E66.1 CLASS 2 DRUG-INDUCED OBESITY WITHOUT SERIOUS COMORBIDITY WITH BODY MASS INDEX (BMI) OF 38.0 TO 38.9 IN ADULT: ICD-10-CM

## 2024-11-06 DIAGNOSIS — K21.9 GASTROESOPHAGEAL REFLUX DISEASE, UNSPECIFIED WHETHER ESOPHAGITIS PRESENT: ICD-10-CM

## 2024-11-06 PROCEDURE — 99213 OFFICE O/P EST LOW 20 MIN: CPT | Performed by: FAMILY MEDICINE

## 2024-11-06 RX ORDER — PHENTERMINE HYDROCHLORIDE 37.5 MG/1
37.5 TABLET ORAL
Qty: 30 TABLET | Refills: 0 | Status: SHIPPED | OUTPATIENT
Start: 2024-11-06 | End: 2024-12-06

## 2024-11-06 ASSESSMENT — PATIENT HEALTH QUESTIONNAIRE - PHQ9
SUM OF ALL RESPONSES TO PHQ QUESTIONS 1-9: 0
2. FEELING DOWN, DEPRESSED OR HOPELESS: NOT AT ALL
SUM OF ALL RESPONSES TO PHQ QUESTIONS 1-9: 0
SUM OF ALL RESPONSES TO PHQ QUESTIONS 1-9: 0
SUM OF ALL RESPONSES TO PHQ9 QUESTIONS 1 & 2: 0
SUM OF ALL RESPONSES TO PHQ QUESTIONS 1-9: 0
1. LITTLE INTEREST OR PLEASURE IN DOING THINGS: NOT AT ALL

## 2024-11-30 DIAGNOSIS — K21.9 GASTROESOPHAGEAL REFLUX DISEASE, UNSPECIFIED WHETHER ESOPHAGITIS PRESENT: ICD-10-CM

## 2024-12-02 RX ORDER — OMEPRAZOLE 40 MG/1
CAPSULE, DELAYED RELEASE ORAL
Qty: 90 CAPSULE | Refills: 0 | Status: SHIPPED | OUTPATIENT
Start: 2024-12-02

## 2024-12-16 DIAGNOSIS — E66.1 CLASS 2 DRUG-INDUCED OBESITY WITHOUT SERIOUS COMORBIDITY WITH BODY MASS INDEX (BMI) OF 38.0 TO 38.9 IN ADULT: ICD-10-CM

## 2024-12-16 DIAGNOSIS — E66.812 CLASS 2 DRUG-INDUCED OBESITY WITHOUT SERIOUS COMORBIDITY WITH BODY MASS INDEX (BMI) OF 38.0 TO 38.9 IN ADULT: ICD-10-CM

## 2024-12-16 RX ORDER — BUPROPION HYDROCHLORIDE 150 MG/1
150 TABLET ORAL EVERY MORNING
Qty: 30 TABLET | Refills: 3 | Status: SHIPPED | OUTPATIENT
Start: 2024-12-16

## 2024-12-18 ENCOUNTER — PATIENT MESSAGE (OUTPATIENT)
Dept: FAMILY MEDICINE CLINIC | Age: 44
End: 2024-12-18

## 2024-12-18 DIAGNOSIS — K21.9 GASTROESOPHAGEAL REFLUX DISEASE, UNSPECIFIED WHETHER ESOPHAGITIS PRESENT: ICD-10-CM

## 2024-12-18 DIAGNOSIS — E66.1 CLASS 2 DRUG-INDUCED OBESITY WITHOUT SERIOUS COMORBIDITY WITH BODY MASS INDEX (BMI) OF 38.0 TO 38.9 IN ADULT: ICD-10-CM

## 2024-12-18 DIAGNOSIS — E66.812 CLASS 2 DRUG-INDUCED OBESITY WITHOUT SERIOUS COMORBIDITY WITH BODY MASS INDEX (BMI) OF 38.0 TO 38.9 IN ADULT: ICD-10-CM

## 2024-12-18 RX ORDER — PHENTERMINE HYDROCHLORIDE 37.5 MG/1
37.5 TABLET ORAL
Qty: 30 TABLET | Refills: 0 | Status: SHIPPED | OUTPATIENT
Start: 2024-12-18 | End: 2025-01-17

## 2025-01-14 DIAGNOSIS — L65.8 FEMALE PATTERN ALOPECIA: ICD-10-CM

## 2025-01-14 RX ORDER — MINOXIDIL 2.5 MG/1
TABLET ORAL
Qty: 15 TABLET | Refills: 5 | Status: SHIPPED | OUTPATIENT
Start: 2025-01-14

## 2025-01-23 ENCOUNTER — PATIENT MESSAGE (OUTPATIENT)
Dept: FAMILY MEDICINE CLINIC | Age: 45
End: 2025-01-23

## 2025-01-23 DIAGNOSIS — E66.1 CLASS 2 DRUG-INDUCED OBESITY WITHOUT SERIOUS COMORBIDITY WITH BODY MASS INDEX (BMI) OF 38.0 TO 38.9 IN ADULT: ICD-10-CM

## 2025-01-23 DIAGNOSIS — K21.9 GASTROESOPHAGEAL REFLUX DISEASE, UNSPECIFIED WHETHER ESOPHAGITIS PRESENT: ICD-10-CM

## 2025-01-23 DIAGNOSIS — E66.812 CLASS 2 DRUG-INDUCED OBESITY WITHOUT SERIOUS COMORBIDITY WITH BODY MASS INDEX (BMI) OF 38.0 TO 38.9 IN ADULT: ICD-10-CM

## 2025-01-24 RX ORDER — PHENTERMINE HYDROCHLORIDE 37.5 MG/1
37.5 TABLET ORAL
Qty: 30 TABLET | Refills: 0 | Status: SHIPPED | OUTPATIENT
Start: 2025-01-24 | End: 2025-02-23

## 2025-02-28 DIAGNOSIS — K21.9 GASTROESOPHAGEAL REFLUX DISEASE, UNSPECIFIED WHETHER ESOPHAGITIS PRESENT: ICD-10-CM

## 2025-02-28 RX ORDER — OMEPRAZOLE 40 MG/1
CAPSULE, DELAYED RELEASE ORAL
Qty: 90 CAPSULE | Refills: 0 | Status: SHIPPED | OUTPATIENT
Start: 2025-02-28

## 2025-03-03 ENCOUNTER — PATIENT MESSAGE (OUTPATIENT)
Dept: FAMILY MEDICINE CLINIC | Age: 45
End: 2025-03-03

## 2025-04-27 DIAGNOSIS — E66.812 CLASS 2 DRUG-INDUCED OBESITY WITHOUT SERIOUS COMORBIDITY WITH BODY MASS INDEX (BMI) OF 38.0 TO 38.9 IN ADULT: ICD-10-CM

## 2025-04-27 DIAGNOSIS — E66.1 CLASS 2 DRUG-INDUCED OBESITY WITHOUT SERIOUS COMORBIDITY WITH BODY MASS INDEX (BMI) OF 38.0 TO 38.9 IN ADULT: ICD-10-CM

## 2025-04-28 RX ORDER — BUPROPION HYDROCHLORIDE 150 MG/1
150 TABLET ORAL EVERY MORNING
Qty: 30 TABLET | Refills: 3 | Status: SHIPPED | OUTPATIENT
Start: 2025-04-28

## 2025-05-27 DIAGNOSIS — K21.9 GASTROESOPHAGEAL REFLUX DISEASE, UNSPECIFIED WHETHER ESOPHAGITIS PRESENT: ICD-10-CM

## 2025-05-28 RX ORDER — OMEPRAZOLE 40 MG/1
40 CAPSULE, DELAYED RELEASE ORAL DAILY
Qty: 90 CAPSULE | Refills: 0 | Status: SHIPPED | OUTPATIENT
Start: 2025-05-28

## 2025-06-11 ENCOUNTER — OFFICE VISIT (OUTPATIENT)
Dept: FAMILY MEDICINE CLINIC | Age: 45
End: 2025-06-11
Payer: COMMERCIAL

## 2025-06-11 VITALS
SYSTOLIC BLOOD PRESSURE: 104 MMHG | TEMPERATURE: 97.1 F | HEART RATE: 96 BPM | BODY MASS INDEX: 39.87 KG/M2 | WEIGHT: 225 LBS | OXYGEN SATURATION: 100 % | DIASTOLIC BLOOD PRESSURE: 71 MMHG

## 2025-06-11 DIAGNOSIS — E66.1 CLASS 2 DRUG-INDUCED OBESITY WITHOUT SERIOUS COMORBIDITY WITH BODY MASS INDEX (BMI) OF 38.0 TO 38.9 IN ADULT: Primary | ICD-10-CM

## 2025-06-11 DIAGNOSIS — E28.2 PCOS (POLYCYSTIC OVARIAN SYNDROME): ICD-10-CM

## 2025-06-11 DIAGNOSIS — E66.812 CLASS 2 DRUG-INDUCED OBESITY WITHOUT SERIOUS COMORBIDITY WITH BODY MASS INDEX (BMI) OF 38.0 TO 38.9 IN ADULT: Primary | ICD-10-CM

## 2025-06-11 PROCEDURE — 99213 OFFICE O/P EST LOW 20 MIN: CPT | Performed by: FAMILY MEDICINE

## 2025-06-11 RX ORDER — PHENTERMINE HYDROCHLORIDE 37.5 MG/1
37.5 TABLET ORAL
Qty: 30 TABLET | Refills: 0 | Status: SHIPPED | OUTPATIENT
Start: 2025-06-11 | End: 2025-07-11

## 2025-06-11 SDOH — ECONOMIC STABILITY: FOOD INSECURITY: WITHIN THE PAST 12 MONTHS, THE FOOD YOU BOUGHT JUST DIDN'T LAST AND YOU DIDN'T HAVE MONEY TO GET MORE.: NEVER TRUE

## 2025-06-11 SDOH — ECONOMIC STABILITY: FOOD INSECURITY: WITHIN THE PAST 12 MONTHS, YOU WORRIED THAT YOUR FOOD WOULD RUN OUT BEFORE YOU GOT MONEY TO BUY MORE.: NEVER TRUE

## 2025-06-11 SDOH — ECONOMIC STABILITY: TRANSPORTATION INSECURITY
IN THE PAST 12 MONTHS, HAS THE LACK OF TRANSPORTATION KEPT YOU FROM MEDICAL APPOINTMENTS OR FROM GETTING MEDICATIONS?: NO

## 2025-06-11 SDOH — ECONOMIC STABILITY: INCOME INSECURITY: IN THE LAST 12 MONTHS, WAS THERE A TIME WHEN YOU WERE NOT ABLE TO PAY THE MORTGAGE OR RENT ON TIME?: NO

## 2025-06-11 SDOH — ECONOMIC STABILITY: TRANSPORTATION INSECURITY
IN THE PAST 12 MONTHS, HAS LACK OF TRANSPORTATION KEPT YOU FROM MEETINGS, WORK, OR FROM GETTING THINGS NEEDED FOR DAILY LIVING?: YES

## 2025-06-11 ASSESSMENT — PATIENT HEALTH QUESTIONNAIRE - PHQ9
2. FEELING DOWN, DEPRESSED OR HOPELESS: NOT AT ALL
SUM OF ALL RESPONSES TO PHQ QUESTIONS 1-9: 1
SUM OF ALL RESPONSES TO PHQ QUESTIONS 1-9: 1
2. FEELING DOWN, DEPRESSED OR HOPELESS: NOT AT ALL
1. LITTLE INTEREST OR PLEASURE IN DOING THINGS: SEVERAL DAYS
SUM OF ALL RESPONSES TO PHQ QUESTIONS 1-9: 1
SUM OF ALL RESPONSES TO PHQ9 QUESTIONS 1 & 2: 1
SUM OF ALL RESPONSES TO PHQ QUESTIONS 1-9: 1
1. LITTLE INTEREST OR PLEASURE IN DOING THINGS: SEVERAL DAYS

## 2025-06-11 NOTE — PROGRESS NOTES
General FM note    Deandra Rivers is a 44 y.o. female who presents today for follow up on her  medical conditions as noted below.  Deandra Rivers is c/o of   Chief Complaint   Patient presents with    Weight Management       Patient Active Problem List:     Abnormal uterine bleeding (AUB)     Menorrhagia with irregular cycle     PCOS (polycystic ovarian syndrome)     Gastroesophageal reflux disease without esophagitis     Past Medical History:   Diagnosis Date    Anxiety     Dizziness     Gall bladder pain     Headache(784.0)     HSV-1 (herpes simplex virus 1) infection     Hyperthyroidism     Obesity       Past Surgical History:   Procedure Laterality Date    ENDOMETRIAL BIOPSY  08/09/2016    negative atypia or malignancy    INTRAUTERINE DEVICE INSERTION  01/13/2017    Mirena     Family History   Problem Relation Age of Onset    Lupus Father     Stroke Father     Alcohol Abuse Father     Alcohol Abuse Mother     Thyroid Disease Mother      Current Outpatient Medications   Medication Sig Dispense Refill    phentermine (ADIPEX-P) 37.5 MG tablet Take 1 tablet by mouth every morning (before breakfast) for 30 days. Max Daily Amount: 37.5 mg 30 tablet 0    omeprazole (PRILOSEC) 40 MG delayed release capsule TAKE 1 CAPSULE BY MOUTH DAILY 90 capsule 0    buPROPion (WELLBUTRIN XL) 150 MG extended release tablet TAKE 1 TABLET BY MOUTH EVERY MORNING 30 tablet 3    minoxidil (LONITEN) 2.5 MG tablet TAKE 1/2 TABLET BY MOUTH DAILY 15 tablet 5    ibuprofen (ADVIL;MOTRIN) 800 MG tablet Take 1 tablet by mouth every 6 hours as needed for Pain 120 tablet 1    cyclobenzaprine (FLEXERIL) 5 MG tablet TAKE ONE TABLET BY MOUTH ONCE NIGHTLY AS NEEDED FOR MUSCLE SPASMS 30 tablet 0    adapalene (DIFFERIN) 0.1 % gel Apply a pea sized amount to the face QHS 45 g 4    Multiple Vitamin (MULTI VITAMIN DAILY PO) Take by mouth      dutasteride (AVODART) 0.5 MG capsule TAKE ONE CAPSULE BY MOUTH DAILY (Patient not taking: Reported on 6/11/2025) 30

## 2025-07-15 ENCOUNTER — TELEMEDICINE (OUTPATIENT)
Dept: FAMILY MEDICINE CLINIC | Age: 45
End: 2025-07-15
Payer: COMMERCIAL

## 2025-07-15 DIAGNOSIS — E66.1 CLASS 2 DRUG-INDUCED OBESITY WITHOUT SERIOUS COMORBIDITY WITH BODY MASS INDEX (BMI) OF 38.0 TO 38.9 IN ADULT: ICD-10-CM

## 2025-07-15 DIAGNOSIS — E66.812 CLASS 2 DRUG-INDUCED OBESITY WITHOUT SERIOUS COMORBIDITY WITH BODY MASS INDEX (BMI) OF 38.0 TO 38.9 IN ADULT: ICD-10-CM

## 2025-07-15 DIAGNOSIS — E28.2 PCOS (POLYCYSTIC OVARIAN SYNDROME): ICD-10-CM

## 2025-07-15 PROCEDURE — 99213 OFFICE O/P EST LOW 20 MIN: CPT | Performed by: FAMILY MEDICINE

## 2025-07-15 RX ORDER — PHENTERMINE HYDROCHLORIDE 37.5 MG/1
37.5 TABLET ORAL
Qty: 30 TABLET | Refills: 0 | Status: SHIPPED | OUTPATIENT
Start: 2025-07-15 | End: 2025-08-14

## 2025-07-15 ASSESSMENT — PATIENT HEALTH QUESTIONNAIRE - PHQ9
SUM OF ALL RESPONSES TO PHQ QUESTIONS 1-9: 0
SUM OF ALL RESPONSES TO PHQ QUESTIONS 1-9: 0
2. FEELING DOWN, DEPRESSED OR HOPELESS: NOT AT ALL
SUM OF ALL RESPONSES TO PHQ QUESTIONS 1-9: 0
1. LITTLE INTEREST OR PLEASURE IN DOING THINGS: NOT AT ALL
SUM OF ALL RESPONSES TO PHQ QUESTIONS 1-9: 0

## 2025-07-15 NOTE — PROGRESS NOTES
General FM note    TeleHealth encounter -- Audio/Visual (During COVID-19 public health emergency)    Deandra Rivers is a 44 y.o. female who presents today for follow up on her  medical conditions as noted below.  Deandra Rivers is c/o of   Chief Complaint   Patient presents with    Weight Management       Patient Active Problem List:     Abnormal uterine bleeding (AUB)     Menorrhagia with irregular cycle     PCOS (polycystic ovarian syndrome)     Gastroesophageal reflux disease without esophagitis     Past Medical History:   Diagnosis Date    Anxiety     Dizziness     Gall bladder pain     Headache(784.0)     HSV-1 (herpes simplex virus 1) infection     Hyperthyroidism     Obesity       Past Surgical History:   Procedure Laterality Date    ENDOMETRIAL BIOPSY  08/09/2016    negative atypia or malignancy    INTRAUTERINE DEVICE INSERTION  01/13/2017    Mirena     Family History   Problem Relation Age of Onset    Lupus Father     Stroke Father     Alcohol Abuse Father     Alcohol Abuse Mother     Thyroid Disease Mother      Current Outpatient Medications   Medication Sig Dispense Refill    phentermine (ADIPEX-P) 37.5 MG tablet Take 1 tablet by mouth every morning (before breakfast) for 30 days. Max Daily Amount: 37.5 mg 30 tablet 0    omeprazole (PRILOSEC) 40 MG delayed release capsule TAKE 1 CAPSULE BY MOUTH DAILY 90 capsule 0    buPROPion (WELLBUTRIN XL) 150 MG extended release tablet TAKE 1 TABLET BY MOUTH EVERY MORNING 30 tablet 3    minoxidil (LONITEN) 2.5 MG tablet TAKE 1/2 TABLET BY MOUTH DAILY 15 tablet 5    ibuprofen (ADVIL;MOTRIN) 800 MG tablet Take 1 tablet by mouth every 6 hours as needed for Pain 120 tablet 1    cyclobenzaprine (FLEXERIL) 5 MG tablet TAKE ONE TABLET BY MOUTH ONCE NIGHTLY AS NEEDED FOR MUSCLE SPASMS 30 tablet 0    adapalene (DIFFERIN) 0.1 % gel Apply a pea sized amount to the face QHS 45 g 4    spironolactone (ALDACTONE) 25 MG tablet TAKE ONE TABLET BY MOUTH DAILY 30 tablet 3    Multiple

## 2025-08-15 ENCOUNTER — TELEMEDICINE (OUTPATIENT)
Dept: FAMILY MEDICINE CLINIC | Age: 45
End: 2025-08-15
Payer: COMMERCIAL

## 2025-08-15 DIAGNOSIS — E66.1 CLASS 2 DRUG-INDUCED OBESITY WITHOUT SERIOUS COMORBIDITY WITH BODY MASS INDEX (BMI) OF 38.0 TO 38.9 IN ADULT: ICD-10-CM

## 2025-08-15 DIAGNOSIS — E66.812 CLASS 2 DRUG-INDUCED OBESITY WITHOUT SERIOUS COMORBIDITY WITH BODY MASS INDEX (BMI) OF 38.0 TO 38.9 IN ADULT: ICD-10-CM

## 2025-08-15 DIAGNOSIS — E28.2 PCOS (POLYCYSTIC OVARIAN SYNDROME): ICD-10-CM

## 2025-08-15 PROCEDURE — 99213 OFFICE O/P EST LOW 20 MIN: CPT | Performed by: FAMILY MEDICINE

## 2025-08-15 RX ORDER — PHENTERMINE HYDROCHLORIDE 37.5 MG/1
37.5 TABLET ORAL
Qty: 30 TABLET | Refills: 0 | Status: SHIPPED | OUTPATIENT
Start: 2025-08-15 | End: 2025-09-14

## 2025-08-30 DIAGNOSIS — L65.8 FEMALE PATTERN ALOPECIA: ICD-10-CM

## 2025-09-02 RX ORDER — MINOXIDIL 2.5 MG/1
1.25 TABLET ORAL DAILY
Qty: 15 TABLET | Refills: 5 | Status: SHIPPED | OUTPATIENT
Start: 2025-09-02